# Patient Record
Sex: FEMALE | Race: WHITE | HISPANIC OR LATINO | Employment: FULL TIME | URBAN - METROPOLITAN AREA
[De-identification: names, ages, dates, MRNs, and addresses within clinical notes are randomized per-mention and may not be internally consistent; named-entity substitution may affect disease eponyms.]

---

## 2017-04-06 ENCOUNTER — GYNECOLOGY VISIT (OUTPATIENT)
Dept: OBGYN | Facility: MEDICAL CENTER | Age: 27
End: 2017-04-06
Payer: COMMERCIAL

## 2017-04-06 ENCOUNTER — HOSPITAL ENCOUNTER (OUTPATIENT)
Facility: MEDICAL CENTER | Age: 27
End: 2017-04-06
Attending: OBSTETRICS & GYNECOLOGY
Payer: COMMERCIAL

## 2017-04-06 VITALS
WEIGHT: 220 LBS | BODY MASS INDEX: 43.19 KG/M2 | HEIGHT: 60 IN | SYSTOLIC BLOOD PRESSURE: 120 MMHG | DIASTOLIC BLOOD PRESSURE: 80 MMHG

## 2017-04-06 DIAGNOSIS — Z12.4 ROUTINE CERVICAL SMEAR: ICD-10-CM

## 2017-04-06 DIAGNOSIS — Z01.419 WELL WOMAN EXAM: ICD-10-CM

## 2017-04-06 DIAGNOSIS — Z11.3 SCREENING EXAMINATION FOR VENEREAL DISEASE: ICD-10-CM

## 2017-04-06 DIAGNOSIS — R63.8 INCREASED BODY MASS INDEX (BMI): ICD-10-CM

## 2017-04-06 PROCEDURE — 88175 CYTOPATH C/V AUTO FLUID REDO: CPT

## 2017-04-06 PROCEDURE — 99395 PREV VISIT EST AGE 18-39: CPT | Performed by: OBSTETRICS & GYNECOLOGY

## 2017-04-06 PROCEDURE — 87591 N.GONORRHOEAE DNA AMP PROB: CPT

## 2017-04-06 PROCEDURE — 87491 CHLMYD TRACH DNA AMP PROBE: CPT

## 2017-04-06 NOTE — MR AVS SNAPSHOT
Ragini Morfin   2017 3:45 PM   Gynecology Visit   MRN: [id number]    Department:  [facility]   Dept Phone:  [phone]    Description:  Female : 1990   Provider:  Analilia Glasgow M.D.           Reason for Visit     Annual Exam Annual exam       Allergies as of 2017     Allergen Noted Reactions    Percocet [Oxycodone-Acetaminophen] 2016       Vicodin [Hydrocodone-Acetaminophen] 2016         You were diagnosed with     Well woman exam   [843528]       Routine cervical smear   [[id number]]       Screening examination for venereal disease   [V74.5.ICD-9-CM]         Vital Signs     Blood Pressure Height Weight Body Mass Index Last Menstrual Period       120/80 mmHg 1.524 m (5') 99.791 kg (220 lb) 42.97 kg/m2 2017       Basic Information     Date Of Birth Sex Race Ethnicity Preferred Language    1990 Female White  Origin (Romansh,Afghan,Cook Islander,Icelandic, etc) English      Health Maintenance        Date Due Completion Dates    IMM DTaP/Tdap/Td Vaccine (1 - Tdap) 2007 ---    PAP SMEAR 2009 ---            Current Immunizations     Influenza Vaccine Quad Inj (Pf) 2016  2:24 PM      Below and/or attached are the medications your provider expects you to take. Review all of your home medications and newly ordered medications with your provider and/or pharmacist. Follow medication instructions as directed by your provider and/or pharmacist. Please keep your medication list with you and share with your provider. Update the information when medications are discontinued, doses are changed, or new medications (including over-the-counter products) are added; and carry medication information at all times in the event of emergency situations     Allergies:  PERCOCET - (reactions not documented)     VICODIN - (reactions not documented)               Medications  Valid as of: 2017 -  4:11 PM    Generic Name Brand Name Tablet Size  Instructions for use    Levonorgest-Eth Estrad 91-Day (Tab) SEASONALE 0.15-0.03 MG Take 1 Tab by mouth every day.        .                 Medicines prescribed today were sent to:     None      Medication refill instructions:       If your prescription bottle indicates you have medication refills left, it is not necessary to call your provider’s office. Please contact your pharmacy and they will refill your medication.    If your prescription bottle indicates you do not have any refills left, you may request refills at any time through one of the following ways: The online [email or web address] system (except Urgent Care), by calling your provider’s office, or by asking your pharmacy to contact your provider’s office with a refill request. Medication refills are processed only during regular business hours and may not be available until the next business day. Your provider may request additional information or to have a follow-up visit with you prior to refilling your medication.   *Please Note: Medication refills are assigned a new Rx number when refilled electronically. Your pharmacy may indicate that no refills were authorized even though a new prescription for the same medication is available at the pharmacy. Please request the medicine by name with the pharmacy before contacting your provider for a refill.        Your To Do List     Future Labs/Procedures Complete By Expires    THINPREP RFLX HPV ASCUS W/CTNG  As directed 4/6/2018         [email or web address] Access Code: Activation code not generated  Current [email or web address] Status: Active

## 2017-04-06 NOTE — PROGRESS NOTES
ANNUAL Gynecologic Exam      HPI Comments:   26 YEAR OLD G0 presents for well woman exam.   Pt had been waiting on approval for Nexplanon. She is currently on her OCP now  Patient's last menstrual period was 03/28/2017.  Regular periods  Denies any pelvic pain  Not currently sexually active  Non- smoker  No family history of breast/ovarian cancer    Review of Systems   Pertinent positives documented in HPI and all other systems reviewed & are negative    All PMH, PSH, allergies, social history and FH reviewed and updated today:  No past medical history on file.  No past surgical history on file.  Percocet and Vicodin  Social History     Social History   • Marital Status: Single     Spouse Name: N/A   • Number of Children: N/A   • Years of Education: N/A     Social History Main Topics   • Smoking status: Not on file   • Smokeless tobacco: Not on file   • Alcohol Use: Not on file   • Drug Use: Not on file   • Sexual Activity: Not on file     Other Topics Concern   • Not on file     Social History Narrative   • No narrative on file     No family history on file.  Medications:   Current Outpatient Prescriptions Ordered in [facility]   Medication Sig Dispense Refill   • levonorgestrel-ethinyl estradiol (JOLESSA) 0.15-0.03 MG per tablet Take 1 Tab by mouth every day. 84 Tab 3     No current Epic-ordered facility-administered medications on file.      Objective:   Vital measurements:  Blood pressure 120/80, height 1.524 m (5'), weight 99.791 kg (220 lb), last menstrual period 03/28/2017.  Body mass index is 42.97 kg/(m^2). (Goal BM I>18 <25)    Physical Exam   Nursing note and vitals reviewed.  Constitutional: She is oriented to person, place, and time. She appears well-developed and well-nourished. No distress.     HEENT:   Head: Normocephalic and atraumatic.   Right Ear: External ear normal.   Left Ear: External ear normal.   Nose: Nose normal.   Eyes: Conjunctivae and EOM are normal. Pupils are equal, round, and reactive to  light. No scleral icterus.     Neck: Normal range of motion. Neck supple. No tracheal deviation present. No thyromegaly present.     Pulmonary/Chest: Effort normal and breath sounds normal. No respiratory distress. She has no wheezes. She has no rales. She exhibits no tenderness.     Cardiovascular: Regular, rate and rhythm. No JVD.    Abdominal: Soft. Bowel sounds are normal. She exhibits no distension and no mass. No tenderness. She has no rebound and no guarding.     Breast:  Hypermastia, L>R  No masses, no skin changes    Genitourinary:  Pelvic exam was performed with patient supine.  External genitalia with no abnormal pigmentation, labial fusion,rash, tenderness, lesion or injury to the labia bilaterally.  Vagina is moist with no lesions, foul discharge, erythema, tenderness or bleeding. No foreign body around the vagina or signs of injury.   Cervix exhibits no motion tenderness, no discharge and no friability.   Uterus and adnexa non palpable because of body habitus    Musculoskeletal: Normal range of motion. She exhibits no edema and no tenderness.     Lymphadenopathy: She has no cervical adenopathy.     Neurological: She is alert and oriented to person, place, and time. She exhibits normal muscle tone.     Skin: Skin is warm and dry. No rash noted. She is not diaphoretic. No erythema. No pallor.     Psychiatric: She has a normal mood and affect. Her behavior is normal. Judgment and thought content normal  Assessment:     1. Well woman exam     2. Routine cervical smear  THINPREP RFLX HPV ASCUS W/CTNG   3. Screening examination for venereal disease       Plan:   TVS done - both ovaries normal; uterus with ??? 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 Pt is asymptomatic will order gyn pelvic US PRN- she agreed with plan  Pap and physical exam performed  Monthly SBE.  Counseling: breast self exam, STD prevention, HIV risk factors and prevention, use and side effects of OCPs and family planning choices  Encourage exercise and  proper diet. Encouraged weight loss  Mammograms starting @ age 40 annually.  See medications and orders placed in encounter report.

## 2017-04-08 LAB
C TRACH DNA GENITAL QL NAA+PROBE: NEGATIVE
CYTOLOGY REG CYTOL: NORMAL
N GONORRHOEA DNA GENITAL QL NAA+PROBE: NEGATIVE
SPECIMEN SOURCE: NORMAL

## 2017-05-23 ENCOUNTER — OFFICE VISIT (OUTPATIENT)
Dept: URGENT CARE | Facility: CLINIC | Age: 27
End: 2017-05-23
Payer: COMMERCIAL

## 2017-05-23 VITALS
TEMPERATURE: 98.5 F | WEIGHT: 216 LBS | BODY MASS INDEX: 42.41 KG/M2 | RESPIRATION RATE: 14 BRPM | SYSTOLIC BLOOD PRESSURE: 130 MMHG | HEART RATE: 110 BPM | OXYGEN SATURATION: 97 % | HEIGHT: 60 IN | DIASTOLIC BLOOD PRESSURE: 80 MMHG

## 2017-05-23 DIAGNOSIS — R05.9 COUGH: ICD-10-CM

## 2017-05-23 DIAGNOSIS — J01.00 ACUTE MAXILLARY SINUSITIS, RECURRENCE NOT SPECIFIED: ICD-10-CM

## 2017-05-23 PROCEDURE — 99204 OFFICE O/P NEW MOD 45 MIN: CPT | Performed by: PHYSICIAN ASSISTANT

## 2017-05-23 RX ORDER — BENZONATATE 200 MG/1
200 CAPSULE ORAL 3 TIMES DAILY PRN
Qty: 30 CAP | Refills: 0 | Status: SHIPPED | OUTPATIENT
Start: 2017-05-23 | End: 2019-01-15

## 2017-05-23 RX ORDER — DOXYCYCLINE HYCLATE 100 MG
100 TABLET ORAL 2 TIMES DAILY
Qty: 20 TAB | Refills: 0 | Status: SHIPPED | OUTPATIENT
Start: 2017-05-23 | End: 2017-06-02

## 2017-05-23 ASSESSMENT — ENCOUNTER SYMPTOMS
TINGLING: 0
HOARSE VOICE: 0
PALPITATIONS: 0
FEVER: 0
CHILLS: 0
VOMITING: 0
NAUSEA: 0
FOCAL WEAKNESS: 0
SHORTNESS OF BREATH: 0
COUGH: 1
SINUS PRESSURE: 1
DIARRHEA: 0
SORE THROAT: 1
MYALGIAS: 0
DIZZINESS: 0
WHEEZING: 0
SENSORY CHANGE: 0
HEADACHES: 0
ABDOMINAL PAIN: 0
SWOLLEN GLANDS: 0
SPUTUM PRODUCTION: 0

## 2017-05-23 NOTE — MR AVS SNAPSHOT
Ragini Morfin   2017 6:00 PM   Office Visit   MRN: [id number]    Department:  [facility]   Dept Phone:  [phone]    Description:  Female : 1990   Provider:  Fanta Campa PA-C           Reason for Visit     URI uri symptoms x 11 days .      Allergies as of 2017     Allergen Noted Reactions    Other Environmental 2017       dust    Percocet [Oxycodone-Acetaminophen] 2016       Vicodin [Hydrocodone-Acetaminophen] 2016         You were diagnosed with     Acute maxillary sinusitis, recurrence not specified   [3736042]       Cough   [786.2.ICD-9-CM]         Vital Signs     Blood Pressure Pulse Temperature Respirations Height Weight    130/80 mmHg 110 36.9 °C (98.5 °F) 14 1.524 m (5') 97.977 kg (216 lb)    Body Mass Index Oxygen Saturation Last Menstrual Period Breastfeeding? Smoking Status       42.18 kg/m2 97% 2017 No Never Smoker        Basic Information     Date Of Birth Sex Race Ethnicity Preferred Language    1990 Female White  Origin (Syrian,Barbadian,Malian,Valeriy, etc) English      Your appointments     2017 11:00 AM   Access New Patient with Thao Rizo M.D.   [facility] (--)    [address] [address] [id number] [phone]           Please bring Photo ID, Insurance Cards, All Medication Bottles and copies of any legal documents (such as Living Will, Power of ) If speaking a language besides English please bring an adult . Please arrive 30 minutes prior for check in and registration. You will be receiving a confirmation call a few days before your appointment from our automated call confirmation system.              Health Maintenance        Date Due Completion Dates    IMM DTaP/Tdap/Td Vaccine (1 - Tdap) 2007 ---    PAP SMEAR 2020            Current Immunizations     Influenza Vaccine Quad Inj (Pf) 2016  2:24 PM      Below  and/or attached are the medications your provider expects you to take. Review all of your home medications and newly ordered medications with your provider and/or pharmacist. Follow medication instructions as directed by your provider and/or pharmacist. Please keep your medication list with you and share with your provider. Update the information when medications are discontinued, doses are changed, or new medications (including over-the-counter products) are added; and carry medication information at all times in the event of emergency situations     Allergies:  OTHER ENVIRONMENTAL - (reactions not documented)     PERCOCET - (reactions not documented)     VICODIN - (reactions not documented)               Medications  Valid as of: May 23, 2017 -  6:49 PM    Generic Name Brand Name Tablet Size Instructions for use    Benzonatate (Cap) TESSALON 200 MG Take 1 Cap by mouth 3 times a day as needed for Cough.        Doxycycline Hyclate (Tab) VIBRAMYCIN 100 MG Take 1 Tab by mouth 2 times a day for 10 days.        Doxylamine-DM   Take  by mouth.        Levonorgest-Eth Estrad 91-Day (Tab) SEASONALE 0.15-0.03 MG Take 1 Tab by mouth every day.        .                 Medicines prescribed today were sent to:     [id number] - [place] - [address]    [address] [facility] [address]    Phone: [phone] Fax: [phone]    Open 24 Hours?: No      Medication refill instructions:       If your prescription bottle indicates you have medication refills left, it is not necessary to call your provider’s office. Please contact your pharmacy and they will refill your medication.    If your prescription bottle indicates you do not have any refills left, you may request refills at any time through one of the following ways: The online [employer] system (except Urgent Care), by calling your provider’s office, or by asking your pharmacy to contact your provider’s office with a refill request. Medication refills are processed only  during regular business hours and may not be available until the next business day. Your provider may request additional information or to have a follow-up visit with you prior to refilling your medication.   *Please Note: Medication refills are assigned a new Rx number when refilled electronically. Your pharmacy may indicate that no refills were authorized even though a new prescription for the same medication is available at the pharmacy. Please request the medicine by name with the pharmacy before contacting your provider for a refill.           [employer] Access Code: Activation code not generated  Current [employer] Status: Active

## 2017-05-24 NOTE — PROGRESS NOTES
Subjective:      Ragini Morfin is a 26 y.o. female who presents with URI            Sinus Problem  This is a new problem. Episode onset: 2 weeks  The problem has been gradually worsening since onset. There has been no fever. Associated symptoms include congestion, coughing, sinus pressure (with purulent rhinorrhea ) and a sore throat (mild). Pertinent negatives include no chills, ear pain, headaches, hoarse voice, shortness of breath or swollen glands. Treatments tried: Robitussin, Dayquil. The treatment provided no relief.     No past medical history on file.  No past surgical history on file.    No family history on file.    Allergies   Allergen Reactions   • Other Environmental      dust   • Percocet [Oxycodone-Acetaminophen]    • Vicodin [Hydrocodone-Acetaminophen]        Medications, Allergies, and current problem list reviewed today in Epic      Review of Systems   Constitutional: Positive for malaise/fatigue. Negative for fever and chills.   HENT: Positive for congestion, sinus pressure (with purulent rhinorrhea ) and sore throat (mild). Negative for ear pain and hoarse voice.    Respiratory: Positive for cough. Negative for sputum production, shortness of breath and wheezing.    Cardiovascular: Negative for chest pain, palpitations and leg swelling.   Gastrointestinal: Negative for nausea, vomiting, abdominal pain and diarrhea.   Musculoskeletal: Negative for myalgias.   Skin: Negative for rash.   Neurological: Negative for dizziness, tingling, sensory change, focal weakness and headaches.     All other systems reviewed and are negative.        Objective:     /80 mmHg  Pulse 110  Temp(Src) 36.9 °C (98.5 °F)  Resp 14  Ht 1.524 m (5')  Wt 97.977 kg (216 lb)  BMI 42.18 kg/m2  SpO2 97%  LMP 04/23/2017  Breastfeeding? No     Physical Exam   Constitutional: She is oriented to person, place, and time. She appears well-developed. No distress.   Over-nourished   HENT:   Head: Normocephalic and  atraumatic.   Right Ear: Tympanic membrane, external ear and ear canal normal.   Left Ear: Tympanic membrane, external ear and ear canal normal.   Nose: Mucosal edema and rhinorrhea present. Right sinus exhibits maxillary sinus tenderness. Left sinus exhibits maxillary sinus tenderness.   Mouth/Throat: Uvula is midline and oropharynx is clear and moist. No oropharyngeal exudate or posterior oropharyngeal erythema.   Eyes: Conjunctivae are normal.   Neck: Neck supple.   Cardiovascular: Normal rate, regular rhythm and normal heart sounds.  Exam reveals no gallop and no friction rub.    No murmur heard.  Pulmonary/Chest: Effort normal and breath sounds normal. No respiratory distress. She has no wheezes. She has no rales.   Spasmodic cough   Lymphadenopathy:     She has no cervical adenopathy.   Neurological: She is alert and oriented to person, place, and time. No cranial nerve deficit.   Skin: Skin is warm and dry. No rash noted.   Psychiatric: She has a normal mood and affect. Her behavior is normal. Judgment and thought content normal.               Assessment/Plan:     1. Acute maxillary sinusitis, recurrence not specified  doxycycline (VIBRAMYCIN) 100 MG Tab   2. Cough  benzonatate (TESSALON) 200 MG capsule       Current outpatient prescriptions:  •  doxycycline (VIBRAMYCIN) 100 MG Tab, Take 1 Tab by mouth 2 times a day for 10 days., Disp: 20 Tab, Rfl: 0  •  benzonatate (TESSALON) 200 MG capsule, Take 1 Cap by mouth 3 times a day as needed for Cough., Disp: 30 Cap, Rfl: 0    Encouraged fluids, rest, humidification.     Differential diagnoses, Supportive care, and indications for immediate follow-up discussed with patient.   Instructed to return to clinic or nearest emergency department for any change in condition, further concerns, or worsening of symptoms.    The patient demonstrated a good understanding and agreed with the treatment plan.    Fanta Campa PA-C

## 2017-06-14 ENCOUNTER — OFFICE VISIT (OUTPATIENT)
Dept: URGENT CARE | Facility: CLINIC | Age: 27
End: 2017-06-14
Payer: COMMERCIAL

## 2017-06-14 VITALS
DIASTOLIC BLOOD PRESSURE: 74 MMHG | OXYGEN SATURATION: 97 % | HEIGHT: 60 IN | BODY MASS INDEX: 42.41 KG/M2 | RESPIRATION RATE: 16 BRPM | HEART RATE: 87 BPM | WEIGHT: 216 LBS | TEMPERATURE: 97.9 F | SYSTOLIC BLOOD PRESSURE: 132 MMHG

## 2017-06-14 DIAGNOSIS — R05.8 POST-VIRAL COUGH SYNDROME: ICD-10-CM

## 2017-06-14 DIAGNOSIS — J30.1 SEASONAL ALLERGIC RHINITIS DUE TO POLLEN: ICD-10-CM

## 2017-06-14 PROCEDURE — 99214 OFFICE O/P EST MOD 30 MIN: CPT | Performed by: PHYSICIAN ASSISTANT

## 2017-06-14 RX ORDER — CODEINE PHOSPHATE AND GUAIFENESIN 10; 100 MG/5ML; MG/5ML
5 SOLUTION ORAL EVERY 4 HOURS PRN
Qty: 120 ML | Refills: 0 | Status: SHIPPED | OUTPATIENT
Start: 2017-06-14

## 2017-06-14 RX ORDER — FLUTICASONE PROPIONATE 50 MCG
1 SPRAY, SUSPENSION (ML) NASAL DAILY
Qty: 16 G | Refills: 3 | Status: SHIPPED | OUTPATIENT
Start: 2017-06-14

## 2017-06-14 ASSESSMENT — ENCOUNTER SYMPTOMS
SHORTNESS OF BREATH: 0
PALPITATIONS: 0
FEVER: 0
NAUSEA: 0
VOMITING: 0
CHILLS: 0
DIARRHEA: 0
ABDOMINAL PAIN: 0
HEADACHES: 0
MUSCULOSKELETAL NEGATIVE: 1
WHEEZING: 0
SORE THROAT: 0
SPUTUM PRODUCTION: 0
COUGH: 1

## 2017-06-14 NOTE — MR AVS SNAPSHOT
Ragini Morfin   2017 6:00 PM   Office Visit   MRN: [id number]    Department:  [facility]   Dept Phone:  [phone]    Description:  Female : 1990   Provider:  Cedric Kamara PA-C           Reason for Visit     Allergic Rhinitis intermittent allergies x w/recent episode x 1 day      Allergies as of 2017     Allergen Noted Reactions    Other Environmental 2017       dust    Percocet [Oxycodone-Acetaminophen] 2016       Vicodin [Hydrocodone-Acetaminophen] 2016         You were diagnosed with     Post-viral cough syndrome   [[id number]]       Seasonal allergic rhinitis due to pollen   [[id number]]         Vital Signs     Blood Pressure Pulse Temperature Respirations Height Weight    132/74 mmHg 87 36.6 °C (97.9 °F) 16 1.524 m (5') 97.977 kg (216 lb)    Body Mass Index Oxygen Saturation Last Menstrual Period Breastfeeding? Smoking Status       42.18 kg/m2 97% 2017 No Never Smoker        Basic Information     Date Of Birth Sex Race Ethnicity Preferred Language    1990 Female White  Origin (Salvadorean,Somali,American,Kenyan, etc) English      Your appointments     2017 11:00 AM   Access New Patient with Thao Rizo M.D.   [facility] (--)    [address] [facility] [address]  [place] [id number] [phone]           Please bring Photo ID, Insurance Cards, All Medication Bottles and copies of any legal documents (such as Living Will, Power of ) If speaking a language besides English please bring an adult . Please arrive 30 minutes prior for check in and registration. You will be receiving a confirmation call a few days before your appointment from our automated call confirmation system.            2017  2:15 PM   Procedure with Analilia Glasgow M.D.   [facility]    [address]  [place] [id number] [phone]              Health Maintenance        Date Due Completion Dates    IMM DTaP/Tdap/Td Vaccine (1 - Tdap) 11/1/2007 ---    PAP SMEAR 4/6/2020 4/6/2017            Current Immunizations     Influenza Vaccine Quad Inj (Pf) 9/29/2016  2:24 PM      Below and/or attached are the medications your provider expects you to take. Review all of your home medications and newly ordered medications with your provider and/or pharmacist. Follow medication instructions as directed by your provider and/or pharmacist. Please keep your medication list with you and share with your provider. Update the information when medications are discontinued, doses are changed, or new medications (including over-the-counter products) are added; and carry medication information at all times in the event of emergency situations     Allergies:  OTHER ENVIRONMENTAL - (reactions not documented)     PERCOCET - (reactions not documented)     VICODIN - (reactions not documented)               Medications  Valid as of: June 14, 2017 -  6:40 PM    Generic Name Brand Name Tablet Size Instructions for use    Benzonatate (Cap) TESSALON 200 MG Take 1 Cap by mouth 3 times a day as needed for Cough.        Fluticasone Propionate (Suspension) FLONASE 50 MCG/ACT Spray 1 Spray in nose every day.        Guaifenesin-Codeine (Solution) ROBITUSSIN -10 mg/5mL Take 5 mL by mouth every four hours as needed for Cough.        Levonorgest-Eth Estrad 91-Day (Tab) SEASONALE 0.15-0.03 MG Take 1 Tab by mouth every day.        .                 Medicines prescribed today were sent to:     [id number] - [place] - [address]    [address] [facility] [place] [address]    Phone: [phone] Fax: [phone]    Open 24 Hours?: No      Medication refill instructions:       If your prescription bottle indicates you have medication refills left, it is not necessary to call your provider’s office. Please contact your pharmacy and they will refill your medication.    If your prescription  bottle indicates you do not have any refills left, you may request refills at any time through one of the following ways: The online "[employer] system (except Urgent Care), by calling your provider’s office, or by asking your pharmacy to contact your provider’s office with a refill request. Medication refills are processed only during regular business hours and may not be available until the next business day. Your provider may request additional information or to have a follow-up visit with you prior to refilling your medication.   *Please Note: Medication refills are assigned a new Rx number when refilled electronically. Your pharmacy may indicate that no refills were authorized even though a new prescription for the same medication is available at the pharmacy. Please request the medicine by name with the pharmacy before contacting your provider for a refill.           "[employer] Access Code: Activation code not generated  Current "[employer] Status: Active

## 2017-06-15 NOTE — PROGRESS NOTES
Subjective:      Ragini Morfin is a 26 y.o. female who presents with Allergic Rhinitis            Cough  This is a new problem. The current episode started 1 to 4 weeks ago. The problem has been waxing and waning. The problem occurs every few minutes. The cough is productive of sputum. Associated symptoms include postnasal drip. Pertinent negatives include no chest pain, chills, ear pain, fever, headaches, nasal congestion, sore throat, shortness of breath or wheezing. The symptoms are aggravated by lying down. She has tried OTC cough suppressant for the symptoms. The treatment provided mild relief. Her past medical history is significant for environmental allergies.       PMH:  has no past medical history on file.  MEDS:   Current outpatient prescriptions:   •  Doxylamine-DM (ROBITUSSIN NIGHTTIME COUGH DM PO), Take  by mouth., Disp: , Rfl:   •  benzonatate (TESSALON) 200 MG capsule, Take 1 Cap by mouth 3 times a day as needed for Cough., Disp: 30 Cap, Rfl: 0  •  levonorgestrel-ethinyl estradiol (JOLESSA) 0.15-0.03 MG per tablet, Take 1 Tab by mouth every day., Disp: 84 Tab, Rfl: 3  ALLERGIES:   Allergies   Allergen Reactions   • Other Environmental      dust   • Percocet [Oxycodone-Acetaminophen]    • Vicodin [Hydrocodone-Acetaminophen]      SURGHX: History reviewed. No pertinent past surgical history.  SOCHX:  reports that she has never smoked. She does not have any smokeless tobacco history on file.  FH: family history is not on file.    Review of Systems   Constitutional: Negative for fever and chills.   HENT: Positive for congestion and postnasal drip. Negative for ear pain and sore throat.    Respiratory: Positive for cough. Negative for sputum production, shortness of breath and wheezing.    Cardiovascular: Negative for chest pain, palpitations and leg swelling.   Gastrointestinal: Negative for nausea, vomiting, abdominal pain and diarrhea.   Musculoskeletal: Negative.    Neurological: Negative for  headaches.   Endo/Heme/Allergies: Positive for environmental allergies.       Medications, Allergies, and current problem list reviewed today in Epic     Objective:     /74 mmHg  Pulse 87  Temp(Src) 36.6 °C (97.9 °F)  Resp 16  Ht 1.524 m (5')  Wt 97.977 kg (216 lb)  BMI 42.18 kg/m2  SpO2 97%  LMP 04/23/2017  Breastfeeding? No     Physical Exam   Constitutional: She is oriented to person, place, and time. She appears well-developed and well-nourished. No distress.   HENT:   Head: Normocephalic and atraumatic.   Right Ear: Tympanic membrane and external ear normal.   Left Ear: Tympanic membrane and external ear normal.   Nose: Nose normal.   Mouth/Throat: Oropharynx is clear and moist. No oropharyngeal exudate.   Eyes: Conjunctivae and EOM are normal. Pupils are equal, round, and reactive to light. Right eye exhibits no discharge. Left eye exhibits no discharge.   Neck: Normal range of motion. Neck supple.   Cardiovascular: Normal rate, regular rhythm and normal heart sounds.    Pulmonary/Chest: Effort normal and breath sounds normal. No respiratory distress. She has no wheezes.   Musculoskeletal: Normal range of motion.   Lymphadenopathy:     She has no cervical adenopathy.   Neurological: She is alert and oriented to person, place, and time.   Skin: Skin is warm and dry. She is not diaphoretic.   Psychiatric: She has a normal mood and affect. Her behavior is normal. Judgment and thought content normal.   Nursing note and vitals reviewed.              Assessment/Plan:     1. Post-viral cough syndrome  guaifenesin-codeine (ROBITUSSIN AC) Solution oral solution   2. Seasonal allergic rhinitis due to pollen  fluticasone (FLONASE) 50 MCG/ACT nasal spray     No signs of bacterial infection. Persistent nighttime cough, history of seasonal allergies.  Continue Allegra, add Flonase  Cough medicine at night  [place]  Aware web site evaluation: I have obtained and reviewed patient utilization report from  [facility] pharmacy database prior to writing prescription for controlled substance II, III or IV. Based on the report and my clinical assessment the prescription is medically necessary.   Patient is cautioned on sedation potential of narcotic medication; no drinking, driving or operating heavy machinery while on this medication.  OTC meds and conservative measures as discussed  Return to clinic or go to ED if symptoms worsen or persist. Indications for ED discussed at length. Patient voices understanding. Follow-up with your primary care provider in 3-5 days. Red flags discussed.    Please note that this dictation was created using voice recognition software. I have made every reasonable attempt to correct obvious errors, but I expect that there are errors of grammar and possibly content that I did not discover before finalizing the note.

## 2017-06-16 ENCOUNTER — APPOINTMENT (OUTPATIENT)
Dept: MEDICAL GROUP | Facility: LAB | Age: 27
End: 2017-06-16
Payer: COMMERCIAL

## 2017-07-25 ENCOUNTER — GYNECOLOGY VISIT (OUTPATIENT)
Dept: OBGYN | Facility: MEDICAL CENTER | Age: 27
End: 2017-07-25
Payer: COMMERCIAL

## 2017-07-25 VITALS
BODY MASS INDEX: 43.59 KG/M2 | HEIGHT: 60 IN | DIASTOLIC BLOOD PRESSURE: 76 MMHG | SYSTOLIC BLOOD PRESSURE: 100 MMHG | WEIGHT: 222 LBS

## 2017-07-25 DIAGNOSIS — Z30.017 NEXPLANON INSERTION: ICD-10-CM

## 2017-07-25 LAB
INT CON NEG: NEGATIVE
INT CON POS: POSITIVE
POC URINE PREGNANCY TEST: NEGATIVE

## 2017-07-25 PROCEDURE — 81025 URINE PREGNANCY TEST: CPT | Performed by: OBSTETRICS & GYNECOLOGY

## 2017-07-25 PROCEDURE — 11981 INSERTION DRUG DLVR IMPLANT: CPT | Performed by: OBSTETRICS & GYNECOLOGY

## 2017-07-25 NOTE — PROGRESS NOTES
Chief Complaint   Patient presents with   • Procedure     nexplanon insertion        History of present illness:   26 y.o. G0 presents for nexplanon insertion  She quit taking OCP x 2 weeks  No other gyn complaints    Review of systems:  Pertinent positives documented in HPI and all other systems reviewed & are negative    All PMH, PSH, allergies, social history and FH reviewed and updated today:  No past medical history on file.    No past surgical history on file.    Allergies:   Allergies   Allergen Reactions   • Other Environmental      dust   • Percocet [Oxycodone-Acetaminophen]    • Vicodin [Hydrocodone-Acetaminophen]        Social History     Social History   • Marital Status: Single     Spouse Name: N/A   • Number of Children: N/A   • Years of Education: N/A     Occupational History   • Not on file.     Social History Main Topics   • Smoking status: Never Smoker    • Smokeless tobacco: Not on file   • Alcohol Use: Not on file   • Drug Use: Not on file   • Sexual Activity: Not on file     Other Topics Concern   • Not on file     Social History Narrative       No family history on file.    Physical exam:  Blood pressure 100/76, height 1.524 m (5'), weight 100.699 kg (222 lb).    General:appears stated age, is in no apparent distress, is well developed and well nourished  Head: normocephalic, non-tender  Skin: No rashes, or ulcers or lesions seen  Psychiatric: Patient shows appropriate affect, is alert and oriented x3, intact judgment and insight.    1. Nexplanon insertion - 7/25/2017  POCT Pregnancy   2. UPT negative  3. Procedure:  Patient explained about the risks and complications of the procedure. Signed consents.   1. Insertion site marked. At the inner side of the left upper arm, about 8-10 cm above the medial epicondyle  2. Insertion site cleansed with povidone-iodine  3. Insertion area anesthesized by 2 cc 2 % lidocaine under the skin along the insertion tunnel.   4. Preloaded disposable Nexplanon is  positioned and inserted in the usual sterile fashion.   5. Nexplanon delivered subdermally, presence verified by palpation by myself and the patient.  6. No bleeding from insertion site.  7. Patient tolerated the procedure well without complications encountered.     No further questions  Annual gyn

## 2017-07-25 NOTE — MR AVS SNAPSHOT
Ragini Morfin   2017 2:15 PM   Gynecology Visit   MRN: [id number]    [facility]   Dept Phone:  [phone]    Description:  Female : 1990   Provider:  Analilia Glasgow M.D.           Reason for Visit     Procedure nexplanon insertion       Allergies as of 2017     Allergen Noted Reactions    Other Environmental 2017       dust    Percocet [Oxycodone-Acetaminophen] 2016       Vicodin [Hydrocodone-Acetaminophen] 2016         You were diagnosed with     Nexplanon insertion   [[id number]]         Vital Signs     Blood Pressure Height Weight Body Mass Index Smoking Status       100/76 mmHg 1.524 m (5') 100.699 kg (222 lb) 43.36 kg/m2 Never Smoker        Basic Information     Date Of Birth Sex Race Ethnicity Preferred Language    1990 Female White  Origin (Danish,Guamanian,K[place]i,Valeriy, etc) English      Health Maintenance        Date Due Completion Dates    IMM DTaP/Tdap/Td Vaccine (1 - Tdap) 2007 ---    IMM INFLUENZA (1) 2017    PAP SMEAR 2020            Results     POCT Pregnancy      Component Value Standard Range & Units    POC Urine Pregnancy Test negative Negative    [id number] exp 18    Internal Control Positive Positive     Internal Control Negative Negative                         Current Immunizations     Influenza Vaccine Quad Inj (Pf) 2016  2:24 PM      Below and/or attached are the medications your provider expects you to take. Review all of your home medications and newly ordered medications with your provider and/or pharmacist. Follow medication instructions as directed by your provider and/or pharmacist. Please keep your medication list with you and share with your provider. Update the information when medications are discontinued, doses are changed, or new medications (including over-the-counter products) are added; and carry medication information at all times in the event  of emergency situations     Allergies:  OTHER ENVIRONMENTAL - (reactions not documented)     PERCOCET - (reactions not documented)     VICODIN - (reactions not documented)               Medications  Valid as of: July 25, 2017 -  2:48 PM    Generic Name Brand Name Tablet Size Instructions for use    Benzonatate (Cap) TESSALON 200 MG Take 1 Cap by mouth 3 times a day as needed for Cough.        Fluticasone Propionate (Suspension) FLONASE 50 MCG/ACT Spray 1 Spray in nose every day.        Guaifenesin-Codeine (Solution) ROBITUSSIN -10 mg/5mL Take 5 mL by mouth every four hours as needed for Cough.        Levonorgest-Eth Estrad 91-Day (Tab) SEASONALE 0.15-0.03 MG Take 1 Tab by mouth every day.        .                 Medicines prescribed today were sent to:     [id number] - [place], [place] - [address]    [email or web address] [place] [address]    Phone: [phone] Fax: [phone]    Open 24 Hours?: No      Medication refill instructions:       If your prescription bottle indicates you have medication refills left, it is not necessary to call your provider’s office. Please contact your pharmacy and they will refill your medication.    If your prescription bottle indicates you do not have any refills left, you may request refills at any time through one of the following ways: The online [facility] system (except Urgent Care), by calling your provider’s office, or by asking your pharmacy to contact your provider’s office with a refill request. Medication refills are processed only during regular business hours and may not be available until the next business day. Your provider may request additional information or to have a follow-up visit with you prior to refilling your medication.   *Please Note: Medication refills are assigned a new Rx number when refilled electronically. Your pharmacy may indicate that no refills were authorized even though a new prescription for the same medication is available at the pharmacy. Please  request the medicine by name with the pharmacy before contacting your provider for a refill.           MyChart Access Code: Activation code not generated  Current MyChart Status: Active

## 2017-10-04 ENCOUNTER — APPOINTMENT (OUTPATIENT)
Dept: SOCIAL WORK | Facility: CLINIC | Age: 27
End: 2017-10-04
Payer: COMMERCIAL

## 2017-10-04 PROCEDURE — 90471 IMMUNIZATION ADMIN: CPT | Performed by: REGISTERED NURSE

## 2017-10-04 PROCEDURE — 90686 IIV4 VACC NO PRSV 0.5 ML IM: CPT | Performed by: REGISTERED NURSE

## 2019-01-15 ENCOUNTER — OFFICE VISIT (OUTPATIENT)
Dept: URGENT CARE | Facility: CLINIC | Age: 29
End: 2019-01-15
Payer: COMMERCIAL

## 2019-01-15 VITALS
BODY MASS INDEX: 43.36 KG/M2 | TEMPERATURE: 98.1 F | HEART RATE: 105 BPM | SYSTOLIC BLOOD PRESSURE: 116 MMHG | DIASTOLIC BLOOD PRESSURE: 78 MMHG | OXYGEN SATURATION: 99 % | HEIGHT: 60 IN | RESPIRATION RATE: 16 BRPM

## 2019-01-15 DIAGNOSIS — R09.82 PND (POST-NASAL DRIP): ICD-10-CM

## 2019-01-15 DIAGNOSIS — R05.8 POST-VIRAL COUGH SYNDROME: ICD-10-CM

## 2019-01-15 DIAGNOSIS — R05.9 COUGH: ICD-10-CM

## 2019-01-15 PROCEDURE — 99214 OFFICE O/P EST MOD 30 MIN: CPT | Performed by: NURSE PRACTITIONER

## 2019-01-15 RX ORDER — DEXTROMETHORPHAN HYDROBROMIDE AND PROMETHAZINE HYDROCHLORIDE 15; 6.25 MG/5ML; MG/5ML
5 SYRUP ORAL EVERY 4 HOURS PRN
Qty: 120 ML | Refills: 0 | Status: SHIPPED | OUTPATIENT
Start: 2019-01-15 | End: 2020-02-08

## 2019-01-15 RX ORDER — BENZONATATE 100 MG/1
100 CAPSULE ORAL 3 TIMES DAILY PRN
Qty: 60 CAP | Refills: 0 | Status: SHIPPED | OUTPATIENT
Start: 2019-01-15

## 2019-01-15 ASSESSMENT — ENCOUNTER SYMPTOMS
EYE PAIN: 0
NAUSEA: 0
SHORTNESS OF BREATH: 0
SORE THROAT: 0
VOMITING: 0
DIZZINESS: 0
HEADACHES: 0
FEVER: 0
CHILLS: 0
COUGH: 1
WHEEZING: 0
MYALGIAS: 0
HEMOPTYSIS: 0
HEARTBURN: 0

## 2019-01-16 NOTE — PROGRESS NOTES
Subjective:   Ragini Morfin is a 28 y.o. female who presents for Cough (x1 month, had a cold and the cough would not go away, thinks it might be from mouse droppings, post nasal drip)        Cough   This is a new problem. The current episode started more than 1 month ago. The problem has been unchanged. The problem occurs constantly. The cough is non-productive. Associated symptoms include postnasal drip. Pertinent negatives include no chest pain, chills, ear pain, fever, headaches, heartburn, hemoptysis, myalgias, nasal congestion, rash, sore throat, shortness of breath or wheezing. Nothing aggravates the symptoms. She has tried OTC cough suppressant for the symptoms. The treatment provided no relief. There is no history of bronchitis.   Patient concerned of cough as she is dealing with mice droppings in her home.  Patient did not wear a respirator when cleaning up.  Patient denies any illness, no fever, no shortness of breath, no difficulty breathing.  Cough is nonproductive.    Review of Systems   Constitutional: Negative for chills and fever.   HENT: Positive for postnasal drip. Negative for ear pain and sore throat.    Eyes: Negative for pain.   Respiratory: Positive for cough. Negative for hemoptysis, shortness of breath and wheezing.    Cardiovascular: Negative for chest pain.   Gastrointestinal: Negative for heartburn, nausea and vomiting.   Genitourinary: Negative for hematuria.   Musculoskeletal: Negative for myalgias.   Skin: Negative for rash.   Neurological: Negative for dizziness and headaches.     Allergies   Allergen Reactions   • Other Environmental      dust   • Percocet [Oxycodone-Acetaminophen]    • Vicodin [Hydrocodone-Acetaminophen]       Objective:   /78   Pulse (!) 105   Temp 36.7 °C (98.1 °F)   Resp 16   Ht 1.524 m (5')   SpO2 99%   BMI 43.36 kg/m²   Physical Exam   Constitutional: She is oriented to person, place, and time. She appears well-developed and well-nourished. No  distress.   HENT:   Head: Normocephalic and atraumatic.   Right Ear: Tympanic membrane normal.   Left Ear: Tympanic membrane normal.   Nose: Rhinorrhea present. Right sinus exhibits no maxillary sinus tenderness and no frontal sinus tenderness. Left sinus exhibits no maxillary sinus tenderness and no frontal sinus tenderness.   Mouth/Throat: Uvula is midline, oropharynx is clear and moist and mucous membranes are normal. No posterior oropharyngeal edema, posterior oropharyngeal erythema or tonsillar abscesses. No tonsillar exudate.   Eyes: Pupils are equal, round, and reactive to light. Conjunctivae and EOM are normal. Right eye exhibits no discharge. Left eye exhibits no discharge.   Cardiovascular: Normal rate and regular rhythm.    No murmur heard.  Pulmonary/Chest: Effort normal. No respiratory distress. She has no decreased breath sounds. She has no wheezes. She has no rhonchi. She has no rales.   Abdominal: Soft. She exhibits no distension. There is no tenderness.   Neurological: She is alert and oriented to person, place, and time. She has normal reflexes. No sensory deficit.   Skin: Skin is warm, dry and intact.   Psychiatric: She has a normal mood and affect.         Assessment/Plan:     1. Cough  benzonatate (TESSALON) 100 MG Cap    promethazine-dextromethorphan (PROMETHAZINE-DM) 6.25-15 MG/5ML syrup   2. Post-viral cough syndrome     3. PND (post-nasal drip)       Patient is a 28-year-old female presents clinic for evaluation of a cough times 1 month.  Patient lung sounds clear to auscultation bilaterally pulse ox adequate.  No infectious etiology suspected.  Discussed mice droppings with patient to low probability of hantavirus exposure.  Red flags discussed.  Encouraged patient to use a respirator when cleaning up box trappings.  Recommended oral decongestant, Flonase daily.  Will trial cough medication.  Patient given precautionary s/sx that mandate immediate follow up and evaluation in the ED. Advised  of risks of not doing so.    DDX, Supportive care, and indications for immediate follow-up discussed with patient.    Instructed to return to clinic or nearest emergency department if we are not available for any change in condition, further concerns, or worsening of symptoms.    The patient demonstrated a good understanding and agreed with the treatment plan.

## 2019-10-15 ENCOUNTER — IMMUNIZATION (OUTPATIENT)
Dept: SOCIAL WORK | Facility: CLINIC | Age: 29
End: 2019-10-15
Payer: COMMERCIAL

## 2019-10-15 DIAGNOSIS — Z23 NEED FOR VACCINATION: ICD-10-CM

## 2019-10-15 PROCEDURE — 90686 IIV4 VACC NO PRSV 0.5 ML IM: CPT | Performed by: REGISTERED NURSE

## 2019-10-15 PROCEDURE — 90471 IMMUNIZATION ADMIN: CPT | Performed by: REGISTERED NURSE

## 2019-12-18 ENCOUNTER — OFFICE VISIT (OUTPATIENT)
Dept: URGENT CARE | Facility: MEDICAL CENTER | Age: 29
End: 2019-12-18
Payer: COMMERCIAL

## 2019-12-18 VITALS
SYSTOLIC BLOOD PRESSURE: 114 MMHG | OXYGEN SATURATION: 94 % | WEIGHT: 217 LBS | HEIGHT: 60 IN | DIASTOLIC BLOOD PRESSURE: 66 MMHG | HEART RATE: 110 BPM | BODY MASS INDEX: 42.6 KG/M2 | TEMPERATURE: 98.9 F | RESPIRATION RATE: 17 BRPM

## 2019-12-18 DIAGNOSIS — J11.1 INFLUENZA-LIKE ILLNESS: ICD-10-CM

## 2019-12-18 DIAGNOSIS — J10.1 INFLUENZA B: ICD-10-CM

## 2019-12-18 LAB
FLUAV+FLUBV AG SPEC QL IA: POSITIVE
INT CON NEG: NORMAL
INT CON POS: NORMAL

## 2019-12-18 PROCEDURE — 87804 INFLUENZA ASSAY W/OPTIC: CPT | Performed by: PHYSICIAN ASSISTANT

## 2019-12-18 PROCEDURE — 99214 OFFICE O/P EST MOD 30 MIN: CPT | Performed by: PHYSICIAN ASSISTANT

## 2019-12-18 RX ORDER — OSELTAMIVIR PHOSPHATE 75 MG/1
75 CAPSULE ORAL 2 TIMES DAILY
Qty: 10 CAP | Refills: 0 | Status: SHIPPED | OUTPATIENT
Start: 2019-12-18

## 2019-12-18 ASSESSMENT — ENCOUNTER SYMPTOMS
HEADACHES: 1
FEVER: 0
SORE THROAT: 1
MYALGIAS: 1
RHINORRHEA: 0
SHORTNESS OF BREATH: 0
WEIGHT LOSS: 0
CHILLS: 1
SWEATS: 0
WHEEZING: 0
COUGH: 1

## 2019-12-18 ASSESSMENT — COPD QUESTIONNAIRES: COPD: 0

## 2019-12-18 NOTE — PROGRESS NOTES
Subjective:      Ragini Morfin is a 29 y.o. female who presents with Cough (x4days cough, sore throat, bosy ache, chest congestion)            Cough   This is a new problem. The current episode started yesterday. The problem has been unchanged. The problem occurs every few minutes. The cough is non-productive. Associated symptoms include chills, headaches, myalgias, nasal congestion and a sore throat. Pertinent negatives include no chest pain, ear congestion, ear pain, fever, rhinorrhea, shortness of breath, sweats, weight loss or wheezing. She has tried nothing for the symptoms. The treatment provided no relief. There is no history of COPD.       Past medical history: Is not pertinent to this examination  Past surgical history: Not pertinent to this examination  Family history: Is not pertinent to this examination  Allergies: No known drug allergies  Social history: Is reviewed in Epic      Review of Systems   Constitutional: Positive for chills. Negative for fever and weight loss.   HENT: Positive for sore throat. Negative for ear pain and rhinorrhea.    Respiratory: Positive for cough. Negative for shortness of breath and wheezing.    Cardiovascular: Negative for chest pain.   Musculoskeletal: Positive for myalgias.   Neurological: Positive for headaches.          Objective:     /66   Pulse (!) 110   Temp 37.2 °C (98.9 °F) (Temporal)   Resp 17   Ht 1.524 m (5')   Wt 98.4 kg (217 lb)   SpO2 94%   BMI 42.38 kg/m²      Physical Exam       Gen.: Patient is A and O ×3, no acute distress, well-nourished well-hydrated  Vitals: Are listed and unremarkable  HEENT: Heads normocephalic, atraumatic, PERRLA, extraocular movements intact, TMs and oropharynx clear  Neck: Soft supple without cervical lymphadenopathy  Cardiovascular: Regular rate and rhythm normal S1 and S2. No murmurs, rubs or gallops  Lungs are clear to auscultation bilaterally. no wheezes rales or rhonchi  Abdomen is soft, nontender,  nondistended with good bowel sounds, no hepatosplenomegaly  Skin: Is well perfused without evidence of rash or lesions  Neurological:  cranial nerves II through XII were assessed and intact.  Musculoskeletal: Full range of motion, 5 out of 5 strength against resistance  Neurovascularly: Intact with a 2 second cap refill, good distal pulses    Urgent care course: rapid influenza was positive for flu B     Assessment/Plan:         1. Influenza-like illness  - POCT Influenza A/B    2. Influenza B  - oseltamivir (TAMIFLU) 75 MG Cap; Take 1 Cap by mouth 2 times a day.  Dispense: 10 Cap; Refill: 0    Other orders  - metformin (GLUCOPHAGE) 1000 MG tablet

## 2019-12-18 NOTE — LETTER
[facility]  [facility]  [address]  [place] [address]     December 18, 2019    Patient: Ragini Morfin   YOB: 1990   Date of Visit: 12/18/2019       To Whom It May Concern:    Ragini Morfin was seen and treated in our department on 12/18/2019. Please excuse from Wednesday 12/18/2019 to Friday 12/20/2019.     Sincerely,     Joann Loving, Med Ass't

## 2020-02-08 ENCOUNTER — OFFICE VISIT (OUTPATIENT)
Dept: URGENT CARE | Facility: CLINIC | Age: 30
End: 2020-02-08
Payer: COMMERCIAL

## 2020-02-08 VITALS
WEIGHT: 200 LBS | HEIGHT: 60 IN | SYSTOLIC BLOOD PRESSURE: 100 MMHG | HEART RATE: 111 BPM | RESPIRATION RATE: 16 BRPM | TEMPERATURE: 98.3 F | BODY MASS INDEX: 39.27 KG/M2 | OXYGEN SATURATION: 95 % | DIASTOLIC BLOOD PRESSURE: 68 MMHG

## 2020-02-08 DIAGNOSIS — J20.9 ACUTE BRONCHITIS, UNSPECIFIED ORGANISM: ICD-10-CM

## 2020-02-08 DIAGNOSIS — R05.8 RESPIRATORY TRACT CONGESTION WITH COUGH: ICD-10-CM

## 2020-02-08 PROCEDURE — 99214 OFFICE O/P EST MOD 30 MIN: CPT | Performed by: NURSE PRACTITIONER

## 2020-02-08 RX ORDER — CODEINE PHOSPHATE AND GUAIFENESIN 10; 100 MG/5ML; MG/5ML
5-10 SOLUTION ORAL EVERY 4 HOURS PRN
Qty: 250 ML | Refills: 0 | Status: SHIPPED | OUTPATIENT
Start: 2020-02-08 | End: 2020-02-15

## 2020-02-08 RX ORDER — AZITHROMYCIN 250 MG/1
TABLET, FILM COATED ORAL
Qty: 6 TAB | Refills: 0 | Status: SHIPPED | OUTPATIENT
Start: 2020-02-08

## 2020-02-08 ASSESSMENT — ENCOUNTER SYMPTOMS
FEVER: 0
SHORTNESS OF BREATH: 0
COUGH: 1
WHEEZING: 1
SORE THROAT: 1
CHILLS: 0
RHINORRHEA: 1

## 2020-02-08 NOTE — PROGRESS NOTES
"Subjective:      Ragini Morfin is a 29 y.o. female who presents with Cough (runny nose, sore throat, x3-4 days )            Cough   This is a new problem. Episode onset: pt reports new onset of ST, runny nose and coughing that started 2 days ago. Denies fevers. No N/V/D. Pt reports she was exposed to \"whooping cough at work 2 weeks ago. Tdap current at 5-6 years ago. The problem has been unchanged. The cough is productive of sputum. Associated symptoms include rhinorrhea, a sore throat and wheezing (slight). Pertinent negatives include no chills, fever or shortness of breath. Associated symptoms comments: Pt reports cough gets worse when she lies down. She has tried rest and OTC cough suppressant for the symptoms. The treatment provided mild relief. There is no history of asthma or pneumonia. pt reports she had whooping cough in high school       Review of Systems   Constitutional: Negative for chills and fever.   HENT: Positive for congestion, rhinorrhea and sore throat.    Respiratory: Positive for cough and wheezing (slight). Negative for shortness of breath.    All other systems reviewed and are negative.    No past medical history on file. No past surgical history on file.   Social History     Socioeconomic History   • Marital status: Single     Spouse name: Not on file   • Number of children: Not on file   • Years of education: Not on file   • Highest education level: Not on file   Occupational History   • Not on file   Social Needs   • Financial resource strain: Not on file   • Food insecurity:     Worry: Not on file     Inability: Not on file   • Transportation needs:     Medical: Not on file     Non-medical: Not on file   Tobacco Use   • Smoking status: Never Smoker   • Smokeless tobacco: Never Used   Substance and Sexual Activity   • Alcohol use: Not on file   • Drug use: Not on file   • Sexual activity: Not on file   Lifestyle   • Physical activity:     Days per week: Not on file     Minutes per " session: Not on file   • Stress: Not on file   Relationships   • Social connections:     Talks on phone: Not on file     Gets together: Not on file     Attends Church service: Not on file     Active member of club or organization: Not on file     Attends meetings of clubs or organizations: Not on file     Relationship status: Not on file   • Intimate partner violence:     Fear of current or ex partner: Not on file     Emotionally abused: Not on file     Physically abused: Not on file     Forced sexual activity: Not on file   Other Topics Concern   • Not on file   Social History Narrative   • Not on file          Objective:     /68   Pulse (!) 111   Temp 36.8 °C (98.3 °F) (Temporal)   Resp 16   Ht 1.524 m (5')   Wt 90.7 kg (200 lb)   SpO2 95%   BMI 39.06 kg/m²      Physical Exam  Vitals signs and nursing note reviewed.   Constitutional:       Appearance: Normal appearance. She is normal weight.   HENT:      Head: Normocephalic and atraumatic.      Right Ear: Tympanic membrane and external ear normal.      Left Ear: Tympanic membrane and external ear normal.      Nose: Congestion and rhinorrhea present.      Mouth/Throat:      Mouth: Mucous membranes are moist.      Pharynx: Oropharynx is clear.   Eyes:      Extraocular Movements: Extraocular movements intact.      Pupils: Pupils are equal, round, and reactive to light.   Neck:      Musculoskeletal: Normal range of motion.   Cardiovascular:      Rate and Rhythm: Normal rate and regular rhythm.   Pulmonary:      Effort: Pulmonary effort is normal.      Breath sounds: Normal breath sounds. No wheezing or rales.      Comments: No bronchospastic cough noted  Musculoskeletal: Normal range of motion.   Skin:     General: Skin is warm and dry.      Capillary Refill: Capillary refill takes less than 2 seconds.   Neurological:      General: No focal deficit present.      Mental Status: She is alert and oriented to person, place, and time. Mental status is at  "baseline.   Psychiatric:         Mood and Affect: Mood normal.         Speech: Speech normal.         Thought Content: Thought content normal.         Judgment: Judgment normal.                 Assessment/Plan:       1. Respiratory tract congestion with cough    2. Acute bronchitis, unspecified organism  - azithromycin (ZITHROMAX) 250 MG Tab; Take as directed  Dispense: 6 Tab; Refill: 0  - guaifenesin-codeine (ROBITUSSIN AC) Solution oral solution; Take 5-10 mL by mouth every four hours as needed for up to 7 days.  Dispense: 250 mL; Refill: 0    Pt states even though she is UTD with her immunizations she remains concerned about her exposure to whooping cough at work. I tried to clarify if the other individual actually tested positive for pertussis and she stated \"their child had it\"  Given her concern and decline of testing, will go ahead and treat. She agrees  Sedating effects of cough syrup discussed. Checked patient's  and find no evidence of narcotic misuse.  Transmission discussed as well as infection precautions  Encouraged rest, fluids and supportive care  Supportive care, differential diagnoses, and indications for immediate follow-up discussed with patient.    Pathogenesis of diagnosis discussed including typical length and natural progression.      Instructed to return to  or nearest emergency department if symptoms fail to improve, for any change in condition, further concerns, or new concerning symptoms.  Patient states understanding of the plan of care and discharge instructions.    "

## 2020-02-10 ENCOUNTER — TELEPHONE (OUTPATIENT)
Dept: URGENT CARE | Facility: CLINIC | Age: 30
End: 2020-02-10

## 2020-02-11 NOTE — TELEPHONE ENCOUNTER
Hi, I called patient and Instructed to return to  or nearest emergency department if symptoms fail to improve, for any change in condition, further concerns, or new concerning symptoms.  Just as the notes say in her visit with Gopi

## 2020-10-07 ENCOUNTER — IMMUNIZATION (OUTPATIENT)
Dept: SOCIAL WORK | Facility: CLINIC | Age: 30
End: 2020-10-07
Payer: COMMERCIAL

## 2020-10-07 DIAGNOSIS — Z23 NEED FOR VACCINATION: Primary | ICD-10-CM

## 2020-10-07 PROCEDURE — 90686 IIV4 VACC NO PRSV 0.5 ML IM: CPT | Performed by: REGISTERED NURSE

## 2020-10-07 PROCEDURE — 90471 IMMUNIZATION ADMIN: CPT | Performed by: REGISTERED NURSE

## 2024-03-25 SDOH — ECONOMIC STABILITY: INCOME INSECURITY: HOW HARD IS IT FOR YOU TO PAY FOR THE VERY BASICS LIKE FOOD, HOUSING, MEDICAL CARE, AND HEATING?: NOT VERY HARD

## 2024-03-25 SDOH — ECONOMIC STABILITY: HOUSING INSECURITY
IN THE LAST 12 MONTHS, WAS THERE A TIME WHEN YOU DID NOT HAVE A STEADY PLACE TO SLEEP OR SLEPT IN A SHELTER (INCLUDING NOW)?: NO

## 2024-03-25 SDOH — ECONOMIC STABILITY: INCOME INSECURITY: IN THE LAST 12 MONTHS, WAS THERE A TIME WHEN YOU WERE NOT ABLE TO PAY THE MORTGAGE OR RENT ON TIME?: NO

## 2024-03-25 SDOH — ECONOMIC STABILITY: FOOD INSECURITY: WITHIN THE PAST 12 MONTHS, YOU WORRIED THAT YOUR FOOD WOULD RUN OUT BEFORE YOU GOT MONEY TO BUY MORE.: NEVER TRUE

## 2024-03-25 SDOH — HEALTH STABILITY: PHYSICAL HEALTH: ON AVERAGE, HOW MANY MINUTES DO YOU ENGAGE IN EXERCISE AT THIS LEVEL?: 50 MIN

## 2024-03-25 SDOH — ECONOMIC STABILITY: TRANSPORTATION INSECURITY
IN THE PAST 12 MONTHS, HAS THE LACK OF TRANSPORTATION KEPT YOU FROM MEDICAL APPOINTMENTS OR FROM GETTING MEDICATIONS?: NO

## 2024-03-25 SDOH — ECONOMIC STABILITY: FOOD INSECURITY: WITHIN THE PAST 12 MONTHS, THE FOOD YOU BOUGHT JUST DIDN'T LAST AND YOU DIDN'T HAVE MONEY TO GET MORE.: NEVER TRUE

## 2024-03-25 SDOH — ECONOMIC STABILITY: TRANSPORTATION INSECURITY
IN THE PAST 12 MONTHS, HAS LACK OF TRANSPORTATION KEPT YOU FROM MEETINGS, WORK, OR FROM GETTING THINGS NEEDED FOR DAILY LIVING?: NO

## 2024-03-25 SDOH — ECONOMIC STABILITY: HOUSING INSECURITY

## 2024-03-25 SDOH — HEALTH STABILITY: PHYSICAL HEALTH: ON AVERAGE, HOW MANY DAYS PER WEEK DO YOU ENGAGE IN MODERATE TO STRENUOUS EXERCISE (LIKE A BRISK WALK)?: 2 DAYS

## 2024-03-25 SDOH — HEALTH STABILITY: MENTAL HEALTH
STRESS IS WHEN SOMEONE FEELS TENSE, NERVOUS, ANXIOUS, OR CAN'T SLEEP AT NIGHT BECAUSE THEIR MIND IS TROUBLED. HOW STRESSED ARE YOU?: TO SOME EXTENT

## 2024-03-25 SDOH — ECONOMIC STABILITY: TRANSPORTATION INSECURITY
IN THE PAST 12 MONTHS, HAS LACK OF RELIABLE TRANSPORTATION KEPT YOU FROM MEDICAL APPOINTMENTS, MEETINGS, WORK OR FROM GETTING THINGS NEEDED FOR DAILY LIVING?: NO

## 2024-03-25 ASSESSMENT — SOCIAL DETERMINANTS OF HEALTH (SDOH)
HOW OFTEN DO YOU ATTEND CHURCH OR RELIGIOUS SERVICES?: NEVER
HOW OFTEN DO YOU ATTENT MEETINGS OF THE CLUB OR ORGANIZATION YOU BELONG TO?: 1 TO 4 TIMES PER YEAR
HOW OFTEN DO YOU ATTEND CHURCH OR RELIGIOUS SERVICES?: NEVER
HOW OFTEN DO YOU HAVE SIX OR MORE DRINKS ON ONE OCCASION: LESS THAN MONTHLY
WITHIN THE PAST 12 MONTHS, YOU WORRIED THAT YOUR FOOD WOULD RUN OUT BEFORE YOU GOT THE MONEY TO BUY MORE: NEVER TRUE
DO YOU BELONG TO ANY CLUBS OR ORGANIZATIONS SUCH AS CHURCH GROUPS UNIONS, FRATERNAL OR ATHLETIC GROUPS, OR SCHOOL GROUPS?: YES
HOW OFTEN DO YOU ATTENT MEETINGS OF THE CLUB OR ORGANIZATION YOU BELONG TO?: 1 TO 4 TIMES PER YEAR
ARE YOU MARRIED, WIDOWED, DIVORCED, SEPARATED, NEVER MARRIED, OR LIVING WITH A PARTNER?: NEVER MARRIED
DO YOU BELONG TO ANY CLUBS OR ORGANIZATIONS SUCH AS CHURCH GROUPS UNIONS, FRATERNAL OR ATHLETIC GROUPS, OR SCHOOL GROUPS?: YES
ARE YOU MARRIED, WIDOWED, DIVORCED, SEPARATED, NEVER MARRIED, OR LIVING WITH A PARTNER?: NEVER MARRIED
HOW OFTEN DO YOU GET TOGETHER WITH FRIENDS OR RELATIVES?: TWICE A WEEK
HOW HARD IS IT FOR YOU TO PAY FOR THE VERY BASICS LIKE FOOD, HOUSING, MEDICAL CARE, AND HEATING?: NOT VERY HARD
IN A TYPICAL WEEK, HOW MANY TIMES DO YOU TALK ON THE PHONE WITH FAMILY, FRIENDS, OR NEIGHBORS?: THREE TIMES A WEEK
HOW MANY DRINKS CONTAINING ALCOHOL DO YOU HAVE ON A TYPICAL DAY WHEN YOU ARE DRINKING: 1 OR 2
HOW OFTEN DO YOU HAVE A DRINK CONTAINING ALCOHOL: 2-4 TIMES A MONTH
IN A TYPICAL WEEK, HOW MANY TIMES DO YOU TALK ON THE PHONE WITH FAMILY, FRIENDS, OR NEIGHBORS?: THREE TIMES A WEEK
HOW OFTEN DO YOU GET TOGETHER WITH FRIENDS OR RELATIVES?: TWICE A WEEK

## 2024-03-25 ASSESSMENT — LIFESTYLE VARIABLES
SKIP TO QUESTIONS 9-10: 0
HOW MANY STANDARD DRINKS CONTAINING ALCOHOL DO YOU HAVE ON A TYPICAL DAY: 1 OR 2
HOW OFTEN DO YOU HAVE SIX OR MORE DRINKS ON ONE OCCASION: LESS THAN MONTHLY
HOW OFTEN DO YOU HAVE A DRINK CONTAINING ALCOHOL: 2-4 TIMES A MONTH
AUDIT-C TOTAL SCORE: 3

## 2024-03-28 ENCOUNTER — OFFICE VISIT (OUTPATIENT)
Dept: MEDICAL GROUP | Facility: CLINIC | Age: 34
End: 2024-03-28
Payer: COMMERCIAL

## 2024-03-28 VITALS
SYSTOLIC BLOOD PRESSURE: 102 MMHG | HEIGHT: 60 IN | DIASTOLIC BLOOD PRESSURE: 70 MMHG | HEART RATE: 89 BPM | TEMPERATURE: 98.5 F | OXYGEN SATURATION: 95 % | BODY MASS INDEX: 48.88 KG/M2 | WEIGHT: 249 LBS

## 2024-03-28 DIAGNOSIS — Z30.09 BIRTH CONTROL COUNSELING: ICD-10-CM

## 2024-03-28 DIAGNOSIS — Z13.9 SCREENING DUE: ICD-10-CM

## 2024-03-28 NOTE — ASSESSMENT & PLAN NOTE
Ordered Nexplanon and will have patient return to clinic on 4/11 for replacement of Nexplanon.  Ordered hemoglobin A1c and lipid panel to assess for metabolic effects associated with PCOS.  Discussed with patient that diet and lifestyle modifications will help with PCOS if she does have it in the right contraceptives will help regulate her menstrual cycles.

## 2024-04-11 ENCOUNTER — APPOINTMENT (OUTPATIENT)
Dept: MEDICAL GROUP | Facility: CLINIC | Age: 34
End: 2024-04-11
Payer: COMMERCIAL

## 2024-05-08 ENCOUNTER — HOSPITAL ENCOUNTER (OUTPATIENT)
Dept: LAB | Facility: MEDICAL CENTER | Age: 34
End: 2024-05-08
Payer: COMMERCIAL

## 2024-05-08 DIAGNOSIS — Z13.9 SCREENING DUE: ICD-10-CM

## 2024-05-08 LAB
ALBUMIN SERPL BCP-MCNC: 4.1 G/DL (ref 3.2–4.9)
ALBUMIN/GLOB SERPL: 1.4 G/DL
ALP SERPL-CCNC: 98 U/L (ref 30–99)
ALT SERPL-CCNC: 20 U/L (ref 2–50)
ANION GAP SERPL CALC-SCNC: 12 MMOL/L (ref 7–16)
AST SERPL-CCNC: 17 U/L (ref 12–45)
BILIRUB SERPL-MCNC: 0.3 MG/DL (ref 0.1–1.5)
BUN SERPL-MCNC: 11 MG/DL (ref 8–22)
CALCIUM ALBUM COR SERPL-MCNC: 8.9 MG/DL (ref 8.5–10.5)
CALCIUM SERPL-MCNC: 9 MG/DL (ref 8.5–10.5)
CHLORIDE SERPL-SCNC: 105 MMOL/L (ref 96–112)
CHOLEST SERPL-MCNC: 156 MG/DL (ref 100–199)
CO2 SERPL-SCNC: 20 MMOL/L (ref 20–33)
CREAT SERPL-MCNC: 0.65 MG/DL (ref 0.5–1.4)
EST. AVERAGE GLUCOSE BLD GHB EST-MCNC: 120 MG/DL
GFR SERPLBLD CREATININE-BSD FMLA CKD-EPI: 117 ML/MIN/1.73 M 2
GLOBULIN SER CALC-MCNC: 2.9 G/DL (ref 1.9–3.5)
GLUCOSE SERPL-MCNC: 100 MG/DL (ref 65–99)
HBA1C MFR BLD: 5.8 % (ref 4–5.6)
HDLC SERPL-MCNC: 48 MG/DL
LDLC SERPL CALC-MCNC: 76 MG/DL
POTASSIUM SERPL-SCNC: 4.1 MMOL/L (ref 3.6–5.5)
PROT SERPL-MCNC: 7 G/DL (ref 6–8.2)
SODIUM SERPL-SCNC: 137 MMOL/L (ref 135–145)
TRIGL SERPL-MCNC: 161 MG/DL (ref 0–149)

## 2024-05-13 ENCOUNTER — APPOINTMENT (OUTPATIENT)
Dept: MEDICAL GROUP | Facility: CLINIC | Age: 34
End: 2024-05-13
Payer: COMMERCIAL

## 2024-05-16 ENCOUNTER — APPOINTMENT (OUTPATIENT)
Dept: MEDICAL GROUP | Facility: CLINIC | Age: 34
End: 2024-05-16
Payer: COMMERCIAL

## 2024-06-10 ENCOUNTER — APPOINTMENT (OUTPATIENT)
Dept: MEDICAL GROUP | Facility: CLINIC | Age: 34
End: 2024-06-10
Payer: COMMERCIAL

## 2024-06-10 VITALS
TEMPERATURE: 97 F | HEIGHT: 60 IN | BODY MASS INDEX: 46.43 KG/M2 | SYSTOLIC BLOOD PRESSURE: 110 MMHG | WEIGHT: 236.5 LBS | HEART RATE: 86 BPM | OXYGEN SATURATION: 97 % | DIASTOLIC BLOOD PRESSURE: 70 MMHG

## 2024-06-10 DIAGNOSIS — R73.03 PREDIABETES: ICD-10-CM

## 2024-06-10 DIAGNOSIS — F41.9 ANXIETY: ICD-10-CM

## 2024-06-10 PROCEDURE — 99213 OFFICE O/P EST LOW 20 MIN: CPT | Mod: GE

## 2024-06-10 RX ORDER — ESCITALOPRAM OXALATE 10 MG/1
10 TABLET ORAL DAILY
Qty: 30 TABLET | Refills: 3 | Status: SHIPPED | OUTPATIENT
Start: 2024-06-10

## 2024-06-10 ASSESSMENT — ANXIETY QUESTIONNAIRES
2. NOT BEING ABLE TO STOP OR CONTROL WORRYING: NEARLY EVERY DAY
GAD7 TOTAL SCORE: 14
4. TROUBLE RELAXING: MORE THAN HALF THE DAYS
5. BEING SO RESTLESS THAT IT IS HARD TO SIT STILL: NOT AT ALL
7. FEELING AFRAID AS IF SOMETHING AWFUL MIGHT HAPPEN: MORE THAN HALF THE DAYS
3. WORRYING TOO MUCH ABOUT DIFFERENT THINGS: MORE THAN HALF THE DAYS
1. FEELING NERVOUS, ANXIOUS, OR ON EDGE: MORE THAN HALF THE DAYS
6. BECOMING EASILY ANNOYED OR IRRITABLE: NEARLY EVERY DAY

## 2024-06-10 ASSESSMENT — PATIENT HEALTH QUESTIONNAIRE - PHQ9
SUM OF ALL RESPONSES TO PHQ QUESTIONS 1-9: 2
5. POOR APPETITE OR OVEREATING: 0 - NOT AT ALL
CLINICAL INTERPRETATION OF PHQ2 SCORE: 1

## 2024-06-10 NOTE — ASSESSMENT & PLAN NOTE
Counseled patient on diet and lifestyle modifications. Recommend 150 minutes of moderate exercise weekly. Also recommend replacing simple carbohydrates like white breads, pasta, and white rice with complex carbohydrates like whole wheat bread, whole wheat/chickpea pasta, brown, rice, quinoa. Also recommend increasing healthy fats in diet such as nuts, avocados, and olive oil.

## 2024-06-10 NOTE — PROGRESS NOTES
Subjective:     CC: Follow-up, anxiety    HPI:   Ragini who presents today with:    Problem   Anxiety    Patient reports having anxiety due to her job that has also been affecting her daily life.  She states that she often worries about what is happening at work even when she is home.  She is often looking for validation from her boss to tell her she is doing things right.  The patient states that she is looking for a new job but feels that she will have to be at this job for a few more months.     Prediabetes    Patient had labs done recently and her hemoglobin A1c was 5.8%.  She does not have a history of prediabetes.  Patient states that she will go on 1-2 20-minute walks a week and could improve on exercise.  She also snacks on chips throughout the day and states that she does not eat sufficient fruits and vegetables.         Current Outpatient Medications Ordered in Epic   Medication Sig Dispense Refill    escitalopram (LEXAPRO) 10 MG Tab Take 1 Tablet by mouth every day. 30 Tablet 3    etonogestrel (NEXPLANON) 68 MG Implant implant NEXPLANON 68 MG IMPL      metformin (GLUCOPHAGE) 1000 MG tablet METFORMIN HCL 1000 MG TABS (Patient not taking: Reported on 6/10/2024)      azithromycin (ZITHROMAX) 250 MG Tab Take as directed (Patient not taking: Reported on 6/10/2024) 6 Tab 0    metformin (GLUCOPHAGE) 1000 MG tablet  (Patient not taking: Reported on 6/10/2024)       No current [facility]-ordered facility-administered medications on file.       ROS:  ROS as per HPI. Otherwise negative.      Objective:     Exam:  /70 (BP Location: Left arm, Patient Position: Sitting, BP Cuff Size: Large adult)   Pulse 86   Temp 36.1 °C (97 °F) (Temporal)   Ht 1.524 m (5')   Wt 107 kg (236 lb 8 oz)   SpO2 97%   BMI 46.19 kg/m²  Body mass index is 46.19 kg/m².    Gen: Alert and oriented, No apparent distress.  HEENT: Normal TM's. Mucous membranes moist and clear. Neck is supple without lymphadenopathy.  Lungs: Normal effort,  CTA bilaterally, no wheezes, rhonchi, or rales  CV: Regular rate and rhythm. No murmurs, rubs, or gallops.  Ext: No clubbing, cyanosis, edema.  Abdomen: No abdominal tenderness. No visible or palpable masses. No rebound or guarding.    Labs:   Results for orders placed or performed during the hospital encounter of 05/08/24   Comp Metabolic Panel   Result Value Ref Range    Sodium 137 135 - 145 mmol/L    Potassium 4.1 3.6 - 5.5 mmol/L    Chloride 105 96 - 112 mmol/L    Co2 20 20 - 33 mmol/L    Anion Gap 12.0 7.0 - 16.0    Glucose 100 (H) 65 - 99 mg/dL    Bun 11 8 - 22 mg/dL    Creatinine 0.65 0.50 - 1.40 mg/dL    Calcium 9.0 8.5 - 10.5 mg/dL    Correct Calcium 8.9 8.5 - 10.5 mg/dL    AST(SGOT) 17 12 - 45 U/L    ALT(SGPT) 20 2 - 50 U/L    Alkaline Phosphatase 98 30 - 99 U/L    Total Bilirubin 0.3 0.1 - 1.5 mg/dL    Albumin 4.1 3.2 - 4.9 g/dL    Total Protein 7.0 6.0 - 8.2 g/dL    Globulin 2.9 1.9 - 3.5 g/dL    A-G Ratio 1.4 g/dL   HEMOGLOBIN A1C   Result Value Ref Range    Glycohemoglobin 5.8 (H) 4.0 - 5.6 %    Est Avg Glucose 120 mg/dL   Lipid Profile   Result Value Ref Range    Cholesterol,Tot 156 100 - 199 mg/dL    Triglycerides 161 (H) 0 - 149 mg/dL    HDL 48 >=40 mg/dL    LDL 76 <100 mg/dL   ESTIMATED GFR   Result Value Ref Range    GFR (CKD-EPI) 117 >60 mL/min/1.73 m 2        Assessment & Plan:     33 y.o. female with the following -     Problem List Items Addressed This Visit       Anxiety     PERLA-7 score 14. Patient is interested in medical treatment and therapy. Will start her on Lexapro 10mg daily and titrate up as needed. Provided patient with therapy resources including [facility]. Recommend follow-up in 4-6 weeks.          Relevant Medications    escitalopram (LEXAPRO) 10 MG Tab    Prediabetes     Counseled patient on diet and lifestyle modifications. Recommend 150 minutes of moderate exercise weekly. Also recommend replacing simple carbohydrates like white breads, pasta, and white rice with  complex carbohydrates like whole wheat bread, whole wheat/chickpea pasta, brown, rice, quinoa. Also recommend increasing healthy fats in diet such as nuts, avocados, and olive oil.                 Return in about 4 weeks (around 7/8/2024).

## 2024-06-10 NOTE — ASSESSMENT & PLAN NOTE
PERLA-7 score 14. Patient is interested in medical treatment and therapy. Will start her on Lexapro 10mg daily and titrate up as needed. Provided patient with therapy resources including [email or web address]. Recommend follow-up in 4-6 weeks.

## 2024-07-20 ENCOUNTER — PATIENT MESSAGE (OUTPATIENT)
Dept: MEDICAL GROUP | Facility: CLINIC | Age: 34
End: 2024-07-20
Payer: COMMERCIAL

## 2024-08-09 ENCOUNTER — APPOINTMENT (OUTPATIENT)
Dept: MEDICAL GROUP | Facility: CLINIC | Age: 34
End: 2024-08-09
Payer: COMMERCIAL

## 2024-08-22 ENCOUNTER — OFFICE VISIT (OUTPATIENT)
Dept: MEDICAL GROUP | Facility: CLINIC | Age: 34
End: 2024-08-22
Payer: COMMERCIAL

## 2024-08-22 VITALS
OXYGEN SATURATION: 94 % | WEIGHT: 240 LBS | HEART RATE: 81 BPM | TEMPERATURE: 97.3 F | HEIGHT: 60 IN | SYSTOLIC BLOOD PRESSURE: 108 MMHG | DIASTOLIC BLOOD PRESSURE: 58 MMHG | BODY MASS INDEX: 47.12 KG/M2

## 2024-08-22 DIAGNOSIS — F41.9 ANXIETY: ICD-10-CM

## 2024-08-22 PROCEDURE — 99213 OFFICE O/P EST LOW 20 MIN: CPT | Mod: GE

## 2024-08-23 NOTE — PROGRESS NOTES
Subjective:     CC: follow-up    HPI:   Ragini with anxiety who presents today with:    Problem   Anxiety    6/10/24: Patient reports having anxiety due to her job that has also been affecting her daily life.  She states that she often worries about what is happening at work even when she is home.  She is often looking for validation from her boss to tell her she is doing things right.  The patient states that she is looking for a new job but feels that she will have to be at this job for a few more months.    8/22/24: Patient reports improvement in her anxiety on her Lexapro. She denies any side effects from the medication. Patient feels that she is on a good dose and does not wish to make any changes at this time.         Current Outpatient Medications Ordered in Epic   Medication Sig Dispense Refill    escitalopram (LEXAPRO) 10 MG Tab Take 1 Tablet by mouth every day. 30 Tablet 3    etonogestrel (NEXPLANON) 68 MG Implant implant NEXPLANON 68 MG IMPL       No current Epic-ordered facility-administered medications on file.       ROS:  ROS as per HPI. Otherwise negative.      Objective:     Exam:  /58   Pulse 81   Temp 36.3 °C (97.3 °F) (Temporal)   Ht 1.524 m (5')   Wt 109 kg (240 lb)   SpO2 94%   BMI 46.87 kg/m²  Body mass index is 46.87 kg/m².    Gen: Alert and oriented, No apparent distress.  HEENT: Normal TM's. Mucous membranes moist and clear.   Lungs: Normal effort, CTA bilaterally, no wheezes, rhonchi, or rales  CV: Regular rate and rhythm. No murmurs, rubs, or gallops.  Ext: No clubbing, cyanosis, edema.    Assessment & Plan:     33 y.o. female with the following -     Problem List Items Addressed This Visit       Anxiety     Continue Lexapro. Provided patient with a physical prescription so that she may get her medication from [email or web address] pharmacy.              Return if symptoms worsen or fail to improve.

## 2024-08-23 NOTE — ASSESSMENT & PLAN NOTE
Continue Lexapro. Provided patient with a physical prescription so that she may get her medication from [facility] pharmacy.

## 2024-09-03 RX ORDER — ESCITALOPRAM OXALATE 10 MG/1
10 TABLET ORAL DAILY
Qty: 30 TABLET | Refills: 11 | Status: SHIPPED | OUTPATIENT
Start: 2024-09-03

## 2024-09-03 NOTE — TELEPHONE ENCOUNTER
Received request via: Pharmacy    Was the patient seen in the last year in this department? Yes    Does the patient have an active prescription (recently filled or refills available) for medication(s) requested? No    [facility]    Does the patient have group home Plus and need 100-day supply? (This applies to ALL medications) Patient does not have SCP

## 2024-09-27 ENCOUNTER — APPOINTMENT (OUTPATIENT)
Dept: MEDICAL GROUP | Facility: CLINIC | Age: 34
End: 2024-09-27
Payer: COMMERCIAL

## 2024-10-04 ENCOUNTER — APPOINTMENT (OUTPATIENT)
Dept: MEDICAL GROUP | Facility: CLINIC | Age: 34
End: 2024-10-04
Payer: COMMERCIAL

## 2024-10-09 DIAGNOSIS — F90.9 ADULT ADHD: ICD-10-CM

## 2024-10-15 ENCOUNTER — APPOINTMENT (OUTPATIENT)
Dept: MEDICAL GROUP | Facility: CLINIC | Age: 34
End: 2024-10-15
Payer: COMMERCIAL

## 2024-12-03 ENCOUNTER — APPOINTMENT (OUTPATIENT)
Dept: BEHAVIORAL HEALTH | Facility: MEDICAL CENTER | Age: 34
End: 2024-12-03
Attending: FAMILY MEDICINE
Payer: COMMERCIAL

## 2025-02-11 ENCOUNTER — OFFICE VISIT (OUTPATIENT)
Dept: MEDICAL GROUP | Facility: CLINIC | Age: 35
End: 2025-02-11
Payer: COMMERCIAL

## 2025-02-11 VITALS
SYSTOLIC BLOOD PRESSURE: 118 MMHG | OXYGEN SATURATION: 97 % | WEIGHT: 238 LBS | RESPIRATION RATE: 20 BRPM | HEIGHT: 60 IN | TEMPERATURE: 97.6 F | BODY MASS INDEX: 46.72 KG/M2 | HEART RATE: 100 BPM | DIASTOLIC BLOOD PRESSURE: 60 MMHG

## 2025-02-11 DIAGNOSIS — E66.813 CLASS 3 SEVERE OBESITY DUE TO EXCESS CALORIES WITHOUT SERIOUS COMORBIDITY WITH BODY MASS INDEX (BMI) OF 45.0 TO 49.9 IN ADULT (HCC): ICD-10-CM

## 2025-02-11 DIAGNOSIS — R73.03 PREDIABETES: ICD-10-CM

## 2025-02-11 DIAGNOSIS — Z30.09 BIRTH CONTROL COUNSELING: ICD-10-CM

## 2025-02-11 DIAGNOSIS — E66.01 CLASS 3 SEVERE OBESITY DUE TO EXCESS CALORIES WITHOUT SERIOUS COMORBIDITY WITH BODY MASS INDEX (BMI) OF 45.0 TO 49.9 IN ADULT (HCC): ICD-10-CM

## 2025-02-11 PROCEDURE — 3074F SYST BP LT 130 MM HG: CPT | Mod: GC

## 2025-02-11 PROCEDURE — 3078F DIAST BP <80 MM HG: CPT | Mod: GC

## 2025-02-11 PROCEDURE — 99214 OFFICE O/P EST MOD 30 MIN: CPT | Mod: GC

## 2025-02-11 RX ORDER — LEVONORGESTREL AND ETHINYL ESTRADIOL 0.15-0.03
1 KIT ORAL DAILY
Qty: 84 TABLET | Refills: 3 | Status: SHIPPED | OUTPATIENT
Start: 2025-02-11 | End: 2025-02-11

## 2025-02-11 RX ORDER — LEVONORGESTREL AND ETHINYL ESTRADIOL 0.15-0.03
1 KIT ORAL DAILY
Qty: 84 TABLET | Refills: 3 | Status: SHIPPED
Start: 2025-02-11

## 2025-02-11 RX ORDER — METFORMIN HYDROCHLORIDE 500 MG/1
500 TABLET, EXTENDED RELEASE ORAL DAILY
Qty: 100 TABLET | Refills: 3 | Status: SHIPPED
Start: 2025-02-11 | End: 2026-03-18

## 2025-02-11 RX ORDER — METFORMIN HYDROCHLORIDE 500 MG/1
500 TABLET, EXTENDED RELEASE ORAL DAILY
Qty: 100 TABLET | Refills: 3 | Status: SHIPPED | OUTPATIENT
Start: 2025-02-11 | End: 2025-02-11

## 2025-02-11 ASSESSMENT — PATIENT HEALTH QUESTIONNAIRE - PHQ9: CLINICAL INTERPRETATION OF PHQ2 SCORE: 0

## 2025-02-11 NOTE — ASSESSMENT & PLAN NOTE
Offered pregnancy test given that patient has been sexually active and her Nexplanon is no longer effective.  Patient declined testing, stating that she had her menstrual cycle 3 days ago and is not concerned about being pregnant.  Discussed with patient that her Nexplanon is no longer effective for pregnancy prevention and she should use a backup method.  Patient would like to go back on Jolessa in the meantime while Nexplanon is being ordered.

## 2025-02-11 NOTE — PROGRESS NOTES
Subjective:     CC: Medication refill    HPI:   Ragini with prediabetes who presents today with:    Problem   Class 3 Severe Obesity Due to Excess Calories Without Serious Comorbidity With Body Mass Index (Bmi) of 45.0 to 49.9 in Adult (Hcc)    Patient is interested in metformin for weight loss.  She has heard about other medications as well for weight loss but is concerned about the GI side effects.     Prediabetes    Patient would like to go back on metformin for her prediabetes as well as PCOS.  She states that she had some successful weight loss with the medication and her menstrual cycles were more regular.  Patient did not have her menstrual cycles with her Nexplanon however, it is long overdue for exchange and she is now having her periods occasionally.     Birth Control Counseling    Patient was unable to return to have her Nexplanon will replace when ordered last year.  She states that she is now having her menstrual cycles and will need it replaced.  Patient has been sexually active and is using condoms as backup.  She is interested in another form of birth control in the meantime on Nexplanon is being ordered.         Current Outpatient Medications Ordered in Epic   Medication Sig Dispense Refill    metFORMIN ER (GLUCOPHAGE XR) 500 MG TABLET SR 24 HR Take 1 Tablet by mouth every day. 100 Tablet 3    levonorgestrel-ethinyl estradiol (JOLESSA) 0.15-0.03 MG per tablet Take 1 Tablet by mouth every day. 84 Tablet 3    escitalopram (LEXAPRO) 10 MG Tab Take 1 Tablet by mouth every day. 30 Tablet 11    etonogestrel (NEXPLANON) 68 MG Implant implant NEXPLANON 68 MG IMPL       No current Epic-ordered facility-administered medications on file.       ROS:  ROS as per HPI. Otherwise negative.      Objective:     Exam:  /60 (BP Location: Left arm, Patient Position: Sitting)   Pulse 100   Temp 36.4 °C (97.6 °F) (Temporal)   Resp 20   Ht 1.524 m (5')   Wt 108 kg (238 lb)   SpO2 97%   BMI 46.48 kg/m²  Body  mass index is 46.48 kg/m².    Gen: Alert and oriented, No apparent distress.  Lungs: Normal effort, CTA bilaterally, no wheezes, rhonchi, or rales  CV: Regular rate and rhythm. No murmurs, rubs, or gallops.    Assessment & Plan:     34 y.o. female with the following -     Problem List Items Addressed This Visit       Birth control counseling     Offered pregnancy test given that patient has been sexually active and her Nexplanon is no longer effective.  Patient declined testing, stating that she had her menstrual cycle 3 days ago and is not concerned about being pregnant.  Discussed with patient that her Nexplanon is no longer effective for pregnancy prevention and she should use a backup method.  Patient would like to go back on Jolessa in the meantime while Nexplanon is being ordered.         Prediabetes     Will restart patient on metformin 500 mg once daily.  Patient is due for repeat labs in 3 months and we will consider changing dosage if patient's A1c has increased.         Class 3 severe obesity due to excess calories without serious comorbidity with body mass index (BMI) of 45.0 to 49.9 in adult (HCC)     Counseled patient on the fit of GLP-1's for weight loss.  Patient is primarily concerned about GI side effects given her previous side effects on metformin.  She had some successful weight loss with metformin and would like to restart that at this time.  We also discussed non-diabetic occasions for weight loss such as phentermine-topiramate which patient will consider in the future.         Relevant Medications    metFORMIN ER (GLUCOPHAGE XR) 500 MG TABLET SR 24 HR         Return in about 4 weeks (around 3/11/2025).

## 2025-02-11 NOTE — ASSESSMENT & PLAN NOTE
Counseled patient on the fit of GLP-1's for weight loss.  Patient is primarily concerned about GI side effects given her previous side effects on metformin.  She had some successful weight loss with metformin and would like to restart that at this time.  We also discussed non-diabetic occasions for weight loss such as phentermine-topiramate which patient will consider in the future.

## 2025-02-11 NOTE — ASSESSMENT & PLAN NOTE
Will restart patient on metformin 500 mg once daily.  Patient is due for repeat labs in 3 months and we will consider changing dosage if patient's A1c has increased.

## 2025-03-05 ENCOUNTER — TELEPHONE (OUTPATIENT)
Dept: BEHAVIORAL HEALTH | Facility: CLINIC | Age: 35
End: 2025-03-05
Payer: COMMERCIAL

## 2025-03-05 NOTE — TELEPHONE ENCOUNTER
Called pt to reschedule appt on 3/6 due to provider having a meeting. Pt raised tone and stated she would not reschedule for something that was not her fault. I let her know we were not able to keep her appt and if she would still like to see  we would have to reschedule. She demanded that she wanted to be rescheduled for the next day and she wanted her co-pay waived. I replied that I would escalate this situation to my supervisor but I could not promise to waive her co-pay. She also stated that she would not tolerate re-scheduling again and we need to manage our calendar better. I did schedule her for 's next available on 3/18.

## 2025-03-18 ENCOUNTER — HOSPITAL ENCOUNTER (OUTPATIENT)
Dept: BEHAVIORAL HEALTH | Facility: MEDICAL CENTER | Age: 35
End: 2025-03-18
Attending: FAMILY MEDICINE
Payer: COMMERCIAL

## 2025-03-18 VITALS
WEIGHT: 237 LBS | SYSTOLIC BLOOD PRESSURE: 110 MMHG | OXYGEN SATURATION: 95 % | BODY MASS INDEX: 46.29 KG/M2 | HEART RATE: 100 BPM | DIASTOLIC BLOOD PRESSURE: 66 MMHG

## 2025-03-18 DIAGNOSIS — F90.0 ATTENTION DEFICIT HYPERACTIVITY DISORDER, INATTENTIVE TYPE: ICD-10-CM

## 2025-03-18 PROCEDURE — 99203 OFFICE O/P NEW LOW 30 MIN: CPT | Performed by: FAMILY MEDICINE

## 2025-03-18 RX ORDER — ATOMOXETINE 40 MG/1
40 CAPSULE ORAL DAILY
Qty: 90 CAPSULE | Refills: 1 | Status: SHIPPED | OUTPATIENT
Start: 2025-03-18

## 2025-03-18 ASSESSMENT — ENCOUNTER SYMPTOMS
NAUSEA: 0
DEPRESSION: 0
EYES NEGATIVE: 1
HEARTBURN: 0
PALPITATIONS: 0
HEADACHES: 0
BLURRED VISION: 0
COUGH: 0
BRUISES/BLEEDS EASILY: 0
HEMOPTYSIS: 0
CARDIOVASCULAR NEGATIVE: 1
CONSTITUTIONAL NEGATIVE: 1
GASTROINTESTINAL NEGATIVE: 1
MUSCULOSKELETAL NEGATIVE: 1
DIZZINESS: 0
NEUROLOGICAL NEGATIVE: 1
DOUBLE VISION: 0
MYALGIAS: 0
PSYCHIATRIC NEGATIVE: 1
RESPIRATORY NEGATIVE: 1
CHILLS: 0
FEVER: 0
TINGLING: 0

## 2025-03-18 ASSESSMENT — ANXIETY QUESTIONNAIRES
IF YOU CHECKED OFF ANY PROBLEMS ON THIS QUESTIONNAIRE, HOW DIFFICULT HAVE THESE PROBLEMS MADE IT FOR YOU TO DO YOUR WORK, TAKE CARE OF THINGS AT HOME, OR GET ALONG WITH OTHER PEOPLE: SOMEWHAT DIFFICULT
6. BECOMING EASILY ANNOYED OR IRRITABLE: MORE THAN HALF THE DAYS
GAD7 TOTAL SCORE: 14
1. FEELING NERVOUS, ANXIOUS, OR ON EDGE: MORE THAN HALF THE DAYS
4. TROUBLE RELAXING: MORE THAN HALF THE DAYS
5. BEING SO RESTLESS THAT IT IS HARD TO SIT STILL: MORE THAN HALF THE DAYS
2. NOT BEING ABLE TO STOP OR CONTROL WORRYING: MORE THAN HALF THE DAYS
7. FEELING AFRAID AS IF SOMETHING AWFUL MIGHT HAPPEN: MORE THAN HALF THE DAYS
3. WORRYING TOO MUCH ABOUT DIFFERENT THINGS: MORE THAN HALF THE DAYS

## 2025-03-18 ASSESSMENT — PATIENT HEALTH QUESTIONNAIRE - PHQ9
CLINICAL INTERPRETATION OF PHQ2 SCORE: 4
SUM OF ALL RESPONSES TO PHQ QUESTIONS 1-9: 18
5. POOR APPETITE OR OVEREATING: 2 - MORE THAN HALF THE DAYS

## 2025-03-18 NOTE — ADDENDUM NOTE
Encounter addended by: Jennifer Daley, Student on: 3/18/2025 8:53 AM   Actions taken: SmartForm saved, Flowsheet accepted

## 2025-03-18 NOTE — PROGRESS NOTES
Psychiatric Progress Note               Author: Jose Cartwright M.D. Date & Time created: 3/18/2025  8:42 AM     Interval History:       Displays poor listening skills Y   Loses and/or misplaces items needed to complete activities or tasks Y   Sidetracked by external or unimportant stimuli N   Forgets daily activities Y   Diminished attention span Y   Lacks ability to complete schoolwork and other assignments or to follow  Instructions N   Avoids or is disinclined to begin homework or activities requiring concentration Y   Fails to focus on details and/or makes thoughtless mistakes in schoolwork or  Assignments Y      6/8 on DSM criteria with a long pattern of issues with focus and concentration dating back to childhood  Impacts her at work and at home in finishing tasks    Patient was advised on dietary changes that may be beneficial in ADD including elimination of artificial dyes (especially red), preservatives, and sugar. Also advised to increase aerobic and anaerobic exercise.        Long discussion and will do trial of strattera and f/u in 4 weeks for efficacy        Review of Systems:  Review of Systems   Constitutional: Negative.  Negative for chills and fever.   HENT: Negative.  Negative for hearing loss.    Eyes: Negative.  Negative for blurred vision and double vision.   Respiratory: Negative.  Negative for cough and hemoptysis.    Cardiovascular: Negative.  Negative for chest pain and palpitations.   Gastrointestinal: Negative.  Negative for heartburn and nausea.   Genitourinary: Negative.  Negative for dysuria.   Musculoskeletal: Negative.  Negative for myalgias.   Skin: Negative.  Negative for rash.   Neurological: Negative.  Negative for dizziness, tingling and headaches.   Endo/Heme/Allergies: Negative.  Does not bruise/bleed easily.   Psychiatric/Behavioral: Negative.  Negative for depression and suicidal ideas.    All other systems reviewed and are negative.      Physical Exam:  Physical  Exam  Vitals reviewed.   Constitutional:       Appearance: Normal appearance.   HENT:      Head: Normocephalic.   Eyes:      Extraocular Movements: Extraocular movements intact.   Neurological:      Mental Status: She is alert and oriented to person, place, and time.   Psychiatric:         Mood and Affect: Mood normal.         Thought Content: Thought content normal.         Judgment: Judgment normal.         Labs:  No results found for this or any previous visit (from the past 24 hours).    Hemodynamics:  No data recorded.     Pulse  Av  Min: 100  Max: 100  Blood Pressure: 110/66     Respiratory:    Pulse Oximetry: 95 %           Fluids:  No intake or output data in the 24 hours ending 25 0842  Weight: 108 kg (237 lb)  GI/Nutrition:  No orders of the defined types were placed in this encounter.    Medications:  Current Outpatient Medications   Medication    metFORMIN ER (GLUCOPHAGE XR) 500 MG TABLET SR 24 HR    levonorgestrel-ethinyl estradiol (JOLESSA) 0.15-0.03 MG per tablet    escitalopram (LEXAPRO) 10 MG Tab    etonogestrel (NEXPLANON) 68 MG Implant implant     No current facility-administered medications for this encounter.     Medical Decision Making, by Problem:  There are no active hospital problems to display for this patient.    Plan:  F90.0

## 2025-03-21 ENCOUNTER — DOCUMENTATION (OUTPATIENT)
Dept: BEHAVIORAL HEALTH | Facility: CLINIC | Age: 35
End: 2025-03-21
Payer: COMMERCIAL

## 2025-04-11 ENCOUNTER — APPOINTMENT (OUTPATIENT)
Dept: MEDICAL GROUP | Facility: CLINIC | Age: 35
End: 2025-04-11
Payer: COMMERCIAL

## 2025-04-16 DIAGNOSIS — F90.0 ATTENTION DEFICIT HYPERACTIVITY DISORDER, INATTENTIVE TYPE: ICD-10-CM

## 2025-04-17 RX ORDER — ATOMOXETINE 40 MG/1
40 CAPSULE ORAL DAILY
Qty: 90 CAPSULE | Refills: 0 | Status: SHIPPED | OUTPATIENT
Start: 2025-04-17

## 2025-04-17 RX ORDER — LEVONORGESTREL AND ETHINYL ESTRADIOL 0.15-0.03
1 KIT ORAL DAILY
Qty: 84 TABLET | Refills: 3 | Status: SHIPPED | OUTPATIENT
Start: 2025-04-17 | End: 2025-04-21 | Stop reason: SDUPTHER

## 2025-04-17 NOTE — TELEPHONE ENCOUNTER
Received request via: Pharmacy    Was the patient seen in the last year in this department? Yes    Does the patient have an active prescription (recently filled or refills available) for medication(s) requested? No    Pharmacy Name: [facility] - [address]     Does the patient have long term Plus and need 100-day supply? (This applies to ALL medications) Patient does not have SCP

## 2025-04-22 NOTE — TELEPHONE ENCOUNTER
Received request via: Pharmacy    Was the patient seen in the last year in this department? Yes    Does the patient have an active prescription (recently filled or refills available) for medication(s) requested? No    Pharmacy Name: [facility] - [address]     Does the patient have group home Plus and need 100-day supply? (This applies to ALL medications) Patient does not have SCP

## 2025-04-22 NOTE — TELEPHONE ENCOUNTER
Received request via: Pharmacy    Was the patient seen in the last year in this department? Yes    Does the patient have an active prescription (recently filled or refills available) for medication(s) requested? No    Pharm[facility] Name: [facility] - [address]     Does the patient have shelter Plus and need 100-day supply? (This applies to ALL medications) Patient does not have SCP

## 2025-04-25 RX ORDER — LEVONORGESTREL AND ETHINYL ESTRADIOL 0.15-0.03
1 KIT ORAL DAILY
Qty: 84 TABLET | Refills: 3 | OUTPATIENT
Start: 2025-04-25

## 2025-04-25 RX ORDER — LEVONORGESTREL AND ETHINYL ESTRADIOL 0.15-0.03
1 KIT ORAL DAILY
Qty: 84 TABLET | Refills: 3 | Status: SHIPPED | OUTPATIENT
Start: 2025-04-25

## 2025-05-09 ENCOUNTER — APPOINTMENT (OUTPATIENT)
Dept: MEDICAL GROUP | Facility: CLINIC | Age: 35
End: 2025-05-09
Payer: COMMERCIAL

## 2025-05-15 ENCOUNTER — APPOINTMENT (OUTPATIENT)
Dept: BEHAVIORAL HEALTH | Facility: MEDICAL CENTER | Age: 35
End: 2025-05-15
Attending: FAMILY MEDICINE
Payer: COMMERCIAL

## 2025-05-29 ENCOUNTER — APPOINTMENT (OUTPATIENT)
Dept: BEHAVIORAL HEALTH | Facility: MEDICAL CENTER | Age: 35
End: 2025-05-29
Attending: FAMILY MEDICINE
Payer: COMMERCIAL

## 2025-06-10 ENCOUNTER — HOSPITAL ENCOUNTER (OUTPATIENT)
Dept: BEHAVIORAL HEALTH | Facility: MEDICAL CENTER | Age: 35
End: 2025-06-10
Attending: FAMILY MEDICINE
Payer: COMMERCIAL

## 2025-06-10 VITALS — HEART RATE: 100 BPM | SYSTOLIC BLOOD PRESSURE: 114 MMHG | OXYGEN SATURATION: 98 % | DIASTOLIC BLOOD PRESSURE: 64 MMHG

## 2025-06-10 DIAGNOSIS — F90.0 ATTENTION DEFICIT HYPERACTIVITY DISORDER, INATTENTIVE TYPE: Primary | ICD-10-CM

## 2025-06-10 PROCEDURE — 99212 OFFICE O/P EST SF 10 MIN: CPT

## 2025-06-10 PROCEDURE — 99214 OFFICE O/P EST MOD 30 MIN: CPT | Performed by: FAMILY MEDICINE

## 2025-06-10 RX ORDER — ATOMOXETINE 40 MG/1
80 CAPSULE ORAL DAILY
Qty: 180 CAPSULE | Refills: 0 | Status: SHIPPED | OUTPATIENT
Start: 2025-06-10

## 2025-06-10 ASSESSMENT — ENCOUNTER SYMPTOMS
CHILLS: 0
EYES NEGATIVE: 1
TINGLING: 0
PSYCHIATRIC NEGATIVE: 1
DEPRESSION: 0
DOUBLE VISION: 0
HEADACHES: 0
CONSTITUTIONAL NEGATIVE: 1
MUSCULOSKELETAL NEGATIVE: 1
CARDIOVASCULAR NEGATIVE: 1
RESPIRATORY NEGATIVE: 1
FEVER: 0
HEMOPTYSIS: 0
GASTROINTESTINAL NEGATIVE: 1
NAUSEA: 0
MYALGIAS: 0
NEUROLOGICAL NEGATIVE: 1
HEARTBURN: 0
COUGH: 0
BLURRED VISION: 0
BRUISES/BLEEDS EASILY: 0
PALPITATIONS: 0
DIZZINESS: 0

## 2025-06-10 NOTE — ADDENDUM NOTE
Encounter addended by: Jennifer Daley, Student on: 6/10/2025 3:15 PM   Actions taken: Charge Capture section accepted

## 2025-06-10 NOTE — PROGRESS NOTES
Psychiatric Progress Note               Author: Jose Cartwright M.D. Date & Time created: 6/10/2025  3:05 PM     Interval History:  Mild improvement in ability to focus and pay attention on 40 mg dose fo strattera  Will increase to 80 mg and do 6 week trial to see if agreeably effective  [unfilled]  Past Medical History[1]  Past Surgical History[2]  Social History[3]  No family history on file.    Current Medications[4]    Patient was instructed on the use of medications, either prescriptions or OTC and informed on when the appropriate follow up time period should be. In addition, patient was also instructed that should any acute worsening occur that they should notify this clinic asap or call 911.        Review of Systems:  Review of Systems   Constitutional: Negative.  Negative for chills and fever.   HENT: Negative.  Negative for hearing loss.    Eyes: Negative.  Negative for blurred vision and double vision.   Respiratory: Negative.  Negative for cough and hemoptysis.    Cardiovascular: Negative.  Negative for chest pain and palpitations.   Gastrointestinal: Negative.  Negative for heartburn and nausea.   Genitourinary: Negative.  Negative for dysuria.   Musculoskeletal: Negative.  Negative for myalgias.   Skin: Negative.  Negative for rash.   Neurological: Negative.  Negative for dizziness, tingling and headaches.   Endo/Heme/Allergies: Negative.  Does not bruise/bleed easily.   Psychiatric/Behavioral: Negative.  Negative for depression and suicidal ideas.    All other systems reviewed and are negative.      Physical Exam:  Physical Exam  Vitals reviewed.   Constitutional:       Appearance: Normal appearance.   HENT:      Head: Normocephalic.   Eyes:      Extraocular Movements: Extraocular movements intact.   Neurological:      Mental Status: She is alert and oriented to person, place, and time.   Psychiatric:         Mood and Affect: Mood normal.         Thought Content: Thought content normal.          Judgment: Judgment normal.         Labs:  No results found for this or any previous visit (from the past 24 hours).    Hemodynamics:  No data recorded.     Pulse  Av  Min: 100  Max: 100  Blood Pressure: 114/64     Respiratory:    Pulse Oximetry: 98 %           Fluids:  No intake or output data in the 24 hours ending 06/10/25 1505     GI/Nutrition:  No orders of the defined types were placed in this encounter.    Medications:  Current Outpatient Medications   Medication    atomoxetine (STRATTERA) 40 MG capsule    levonorgestrel-ethinyl estradiol (JOLESSA) 0.15-0.03 MG per tablet    metFORMIN ER (GLUCOPHAGE XR) 500 MG TABLET SR 24 HR    escitalopram (LEXAPRO) 10 MG Tab    etonogestrel (NEXPLANON) 68 MG Implant implant     No current facility-administered medications for this encounter.     Medical Decision Making, by Problem:  There are no active hospital problems to display for this patient.    Plan:    1. Attention deficit hyperactivity disorder, inattentive type  atomoxetine (STRATTERA) 40 MG capsule               [1]   Past Medical History:  Diagnosis Date    Anxiety    [2] No past surgical history on file.  [3]   Social History  Tobacco Use    Smoking status: Never    Smokeless tobacco: Never   [4]   Current Outpatient Medications:     atomoxetine (STRATTERA) 40 MG capsule, Take 2 Capsules by mouth every day., Disp: 180 Capsule, Rfl: 0    levonorgestrel-ethinyl estradiol (JOLESSA) 0.15-0.03 MG per tablet, Take 1 Tablet by mouth every day., Disp: 84 Tablet, Rfl: 3    metFORMIN ER (GLUCOPHAGE XR) 500 MG TABLET SR 24 HR, Take 1 Tablet by mouth every day., Disp: 100 Tablet, Rfl: 3    escitalopram (LEXAPRO) 10 MG Tab, Take 1 Tablet by mouth every day., Disp: 30 Tablet, Rfl: 11    etonogestrel (NEXPLANON) 68 MG Implant implant, , Disp: , Rfl:

## 2025-08-28 ENCOUNTER — APPOINTMENT (OUTPATIENT)
Dept: BEHAVIORAL HEALTH | Facility: MEDICAL CENTER | Age: 35
End: 2025-08-28
Attending: FAMILY MEDICINE
Payer: COMMERCIAL